# Patient Record
Sex: MALE | Race: WHITE | NOT HISPANIC OR LATINO | URBAN - METROPOLITAN AREA
[De-identification: names, ages, dates, MRNs, and addresses within clinical notes are randomized per-mention and may not be internally consistent; named-entity substitution may affect disease eponyms.]

---

## 2019-05-20 ENCOUNTER — TELEPHONE (OUTPATIENT)
Dept: OTHER | Facility: CLINIC | Age: 24
End: 2019-05-20

## 2019-07-19 ENCOUNTER — TELEPHONE (OUTPATIENT)
Dept: OTHER | Facility: CLINIC | Age: 24
End: 2019-07-19

## 2021-03-15 ENCOUNTER — TELEPHONE (OUTPATIENT)
Dept: FAMILY MEDICINE | Facility: CLINIC | Age: 26
End: 2021-03-15

## 2021-03-15 ENCOUNTER — OFFICE VISIT (OUTPATIENT)
Dept: FAMILY MEDICINE | Facility: CLINIC | Age: 26
End: 2021-03-15
Payer: COMMERCIAL

## 2021-03-15 VITALS
DIASTOLIC BLOOD PRESSURE: 97 MMHG | SYSTOLIC BLOOD PRESSURE: 165 MMHG | BODY MASS INDEX: 37.18 KG/M2 | WEIGHT: 299 LBS | HEART RATE: 90 BPM | RESPIRATION RATE: 15 BRPM | TEMPERATURE: 98.6 F | OXYGEN SATURATION: 95 % | HEIGHT: 75 IN

## 2021-03-15 DIAGNOSIS — M75.52 SCAPULOTHORACIC BURSITIS OF LEFT SHOULDER: Primary | ICD-10-CM

## 2021-03-15 RX ORDER — DEXTROAMPHETAMINE SACCHARATE, AMPHETAMINE ASPARTATE MONOHYDRATE, DEXTROAMPHETAMINE SULFATE AND AMPHETAMINE SULFATE 5; 5; 5; 5 MG/1; MG/1; MG/1; MG/1
20 CAPSULE, EXTENDED RELEASE ORAL
COMMUNITY
End: 2023-12-14

## 2021-03-15 ASSESSMENT — MIFFLIN-ST. JEOR: SCORE: 2426.89

## 2021-03-15 NOTE — LETTER
Return to Work  March 15, 2021     Seen today: yes    Patient:  Ned Lugo  :   1995  MRN:     [id number]  Physician: AMY ABRAHAM    Ned Lugo was seen today in clinic due to LEFT mid-back pain.   Sent to physical therapy.   Anticipate improvement over the next few weeks.   Electronically signed by Amy Abraham MD

## 2021-03-15 NOTE — PATIENT INSTRUCTIONS
1. LEFT scapulothoracic dysfunction   - Referred to Physical therapy.   - Also, given Rx for Voltaren ointment  - Icy-Hot patches at bedtime can help.  - [place] with pillows behind LEFT shoulder  - Decrease use of Ibuprofen. No more than 2,400 mg/day. Aim for less    2. Elevated blood pressure and BMI  -Discussed weight. He's drinking lots of sugar sweetened beverages  -Offered blood tests for diabetes and for thyroid. [place] deferred.   -Encouraged to schedule visit for primary care. Mentioned '[place]'    - Return if thoracic back/shoulder not improving.     --Rahul Abraham MD

## 2021-03-15 NOTE — PROGRESS NOTES
OVERVIEW: Ned Lugo is a 25 year old male who presents to [facility] today for Back Pain (x 3 weeks, initially coughed after moving/sleeping wrong, has been seeing a chiropracter, behind shoulder blade on left side, radiating to left ribs, not getting better )      ASSESSMENT/PLAN:  1. LEFT scapulothoracic dysfunction   - Referred to Physical therapy.   - Also, given Rx for Voltaren ointment  - Icy-Hot patches at bedtime can help.  - [place] with pillows behind LEFT shoulder  - Decrease use of Ibuprofen. No more than 2,400 mg/day. Aim for less    2. Elevated blood pressure and BMI  -Discussed weight. He's drinking lots of sugar sweetened beverages  -Offered blood tests for diabetes and for thyroid. Ned deferred.   -Encouraged to schedule visit for primary care. Mentioned '[place]'    - Return if thoracic back/shoulder not improving.     --Rahul Abraham MD      SUBJECTIVE:     Upper back pain  -3.5 weeks ago woke up with upper back pain several days after moving and lifting heavy boxes  - No recent trauma or new strenuous activity  - Pain is worst behind the left shoulder blade  - Aggravated by rasing left arm or coughing  - Worsened 2 weeks ago after coughing  - Hx of lower back pain, though this is different as it is in the upper back  - Has been seeing chiropractor for 2 weeks, feels like this has helped somewhat  - Switched mattresses 3 days ago, pain has been worse since that time  - Taking 1000 mg Ibuprofen every couple hours, without significant relief  - No numbness. Does occasionally feel week in the left hand.     Health maintainance:  - Pt reports he has gained approximately 30 lbs in the last year  - Has not had recent diabetes screening  - BP was 165/97 today, pt does not have a bp cuff at home to measure this  - Would like to defer establishing care for health maintenance/preventative medicine today    Review Of Systems:  Has otherwise been in usual state of health, e.g.  "  Cardiovascular: negative, palpitations, chest pain and exertional chest pain or pressure  Respiratory: No shortness of breath, dyspnea on exertion, cough, or hemoptysis.  Gastrointestinal: No abdominal pain, constipation, diarrhea, or incontinence,  Genitourinary: No urinary incontinence.     Problem list per EMR:  There is no problem list on file for this patient.    Current Outpatient Medications   Medication Sig Dispense Refill     amphetamine-dextroamphetamine (ADDERALL XR) 20 MG 24 hr capsule Take 20 mg by mouth       diclofenac (VOLTAREN) 1 % topical gel Apply 2 g topically 4 times daily 50 g 0     Allergies   Allergen Reactions     Seasonal Allergies       Social:   Smoker, currently lives with partner near [place]      OBJECTIVE    Vitals: BP (!) 165/97   Pulse 90   Temp 98.6  F (37  C) (Skin)   Resp 15   Ht 1.905 m (6' 3\")   Wt 135.6 kg (299 lb)   SpO2 95%   BMI 37.37 kg/m    BMI= Body mass index is 37.37 kg/m .    General: Above BMI noted.  Otherwise appears in no distress  Respiratory:  Lungs clear to auscultation bilaterally, no crackles/rubs/wheezes. Avoiding deep inspiration secondary to pain  CV: Normal rate and rhythm, no murmurs/rubs/gallops.  GI:  Abdomen soft and non-distended. Normoactive BS. No tenderness, guarding or rebound  Skin: Warm, dry. No rashes or petechiae. No lesions over the upper back.   Musculoskeletal: No peripheral edema or calf tenderness  Back: No lesions or rashes over the back. No midline bony tenderness, considerable tenderness to palpation over the musculature surrounding the left shoulder blade, most prominent over the upper left  paraspinous musculature at the level of T6. ROM of the left shoulder limited secondary to pain. Right side is normal without any tenderness to palpation.   Neuro: Alert and oriented to person/place/time. Sensation is intact over the bilateral upper extremities. Strength in the left upper extremity appears somewhat decreased though " this appears to be volitional secondary to pain in the left shoulder.    Psychiatric: Normal affect.    SEE TOP OF NOTE FOR ASSESSMENT AND PLAN    --Rahul Abraham MD  [facility], Department of Family Medicine and Community Health

## 2021-03-15 NOTE — NURSING NOTE
"25 year old  Chief Complaint   Patient presents with     Back Pain     x 3 weeks, initially coughed after moving/sleeping wrong, has been seeing a chiropracter, behind shoulder blade on left side, radiating to left ribs, not getting better        Blood pressure (!) 148/104, pulse 90, temperature 98.6  F (37  C), temperature source Skin, resp. rate 15, height 1.905 m (6' 3\"), weight 135.6 kg (299 lb), SpO2 95 %. Body mass index is 37.37 kg/m .  There is no problem list on file for this patient.      Wt Readings from Last 2 Encounters:   03/15/21 135.6 kg (299 lb)     BP Readings from Last 3 Encounters:   03/15/21 (!) 148/104         Current Outpatient Medications   Medication     amphetamine-dextroamphetamine (ADDERALL XR) 20 MG 24 hr capsule     No current facility-administered medications for this visit.        Social History     Tobacco Use     Smoking status: Current Every Day Smoker     Types: Cigarettes     Smokeless tobacco: Never Used   Substance Use Topics     Alcohol use: None     Drug use: None       Health Maintenance Due   Topic Date Due     PREVENTIVE CARE VISIT  Never done     ADVANCE CARE PLANNING  Never done     HPV IMMUNIZATION (1 - Male 2-dose series) Never done     HIV SCREENING  Never done     HEPATITIS C SCREENING  Never done     DTAP/TDAP/TD IMMUNIZATION (1 - Tdap) Never done     INFLUENZA VACCINE (1) 09/01/2020       No results found for: PAP      March 15, 2021 2:48 PM    "

## 2021-03-16 ENCOUNTER — THERAPY VISIT (OUTPATIENT)
Dept: PHYSICAL THERAPY | Facility: CLINIC | Age: 26
End: 2021-03-16
Payer: COMMERCIAL

## 2021-03-16 DIAGNOSIS — M75.52 SCAPULOTHORACIC BURSITIS OF LEFT SHOULDER: ICD-10-CM

## 2021-03-16 DIAGNOSIS — M25.512 LEFT SHOULDER PAIN: Primary | ICD-10-CM

## 2021-03-16 PROCEDURE — 97161 PT EVAL LOW COMPLEX 20 MIN: CPT | Mod: GP | Performed by: PHYSICAL THERAPIST

## 2021-03-16 PROCEDURE — 97110 THERAPEUTIC EXERCISES: CPT | Mod: GP | Performed by: PHYSICAL THERAPIST

## 2021-03-16 NOTE — PROGRESS NOTES
Physical Therapy Initial Examination/Evaluation  March 16, 2021    Ned Lugo is a 25 year old male referred to physical therapy by Rahul Abraham MD for treatment of left shoulder/thoracic pain with Precautions/Restrictions/MD instructions eval and treat    Therapist Impression:   Patient presents with increased pain with overhead motion secondary to decreased upright posture including forward head, protracted shoulders with internal rotation at rest. Patient given HEP focusing on posture, pec stretch and sidelying ER to increase posterior strength.     Subjective:  DOI/onset: 2/20/21 DOS: na  Acute Injury or Gradual Onset?: Acute injury onset  Mechanism of Injury: Lifting, moving  Related PMH: sciatica pain, minor back pain Previous Treatment: chiropractor, heat, ice, ibpruprofin Effect of prior treatment: good, then had to sleep with mattress on floor  Imaging: None  Chief Complaint/Functional Limitations:   Reaching overhead pain and see below in therapy evaluation codes   Pain: rest 0 /10, activity 8/10 Location: posterier shoulder, along rib  Frequency: Intermittent Described as: aching and sharp Alleviated by: Rest, Ice and pain meds Progression of Symptoms: Unchanged Time of day when pain is worse: Activity related, sleeping  Sleeping: Interrupted due to current issue   Occupation: line cook  Job duties: repetitive tasks, lifting/carrying, cutting at counter  Current HEP/exercise regimen: walks  Patient's goals are overhead reaching without pain, see chief complaints     Other pertinent PMH/Red Flags: Asthma, Depression, Mental Illness, Overweight, Sleep Disorder/Apnea, Smoking   Barriers at home/work: None as reported by patient  Pertinent Surgical History: na  Medications: omeprozal   General health as reported by patient: fair  Return to MD:  prn    SHOULDER EXAMINATION  Diagnosis: L shoulder pain    CERVICAL SCREEN  AROM: WNL/symmetrical pain-free    STATIC POSTURE  Forward head: moderate   Rounded  shoulders:moderate  Shoulder internally rotated: moderate   Visual inspection: Protracted scapula     DYNAMIC SCAPULAR TESTS  Dynamic Scapular Assessment: Delayed upward rotation on L and Protracted scapula     SHOULDER RANGE OF MOTION  AROM Flexion Abduction ER  Ext/IR   Left WNL - Painful arc  WNL - Painful arc , slight anterior drift WNL T6   Right WNL WNL WNL T6         SHOULDER STRENGTH  HHD Flexion Abduction ER Belly Press   Left 5/5 3+/5 - Painful 5-/5 5-/5   Right 5/5 5-/5 5-/5 5-/5       PALPATION  Left: Mild tenderness to palpation at medial boarder of L scapula  Right: No tenderness to palpation    Assessment/Plan:  Patient is a 25 year old male with thoracic and left side shoulder complaints.    Patient has the following significant findings with corresponding treatment plan.                Diagnosis 1:    Pain -  hot/cold therapy and manual therapy  Diagnosis 2:    Decreased Strength - therapeutic exercise and home program  Diagnosis 3:    Decreased function - therapeutic activities and home program     Therapy Evaluation Codes:   1) History comprised of:   Personal factors that impact the plan of care:      None.    Comorbidity factors that impact the plan of care are:      Asthma, Depression, Mental illness, Overweight, Sleep disorder/apnea and Smoking.     Medications impacting care: Acid reducer.  2) Examination of Body Systems comprised of:   Body structures and functions that impact the plan of care:      Shoulder and Thoracic Spine.   Activity limitations that impact the plan of care are:      Cooking, Dressing and Reaching overhead.  3) Clinical presentation characteristics are:   Stable/Uncomplicated.  4) Decision-Making    Low complexity using standardized patient assessment instrument and/or measureable assessment of functional outcome.  Cumulative Therapy Evaluation is: Low complexity.    Previous and current functional limitations:  (See Goal Flow Sheet for this information)     Short term and Long term goals: (See Goal Flow Sheet for this information)     Communication ability:  Patient appears to be able to clearly communicate and understand verbal and written communication and follow directions correctly.  Treatment Explanation - The following has been discussed with the patient:   RX ordered/plan of care  Anticipated outcomes  Possible risks and side effects  This patient would benefit from PT intervention to resume normal activities.   Rehab potential is good.    Frequency:  2 X a month, once daily  Duration:  for 3 months  Discharge Plan:  Achieve all LTG.  Independent in home treatment program.  Reach maximal therapeutic benefit.    Please refer to the daily flowsheet for treatment today, total treatment time and time spent performing 1:1 timed codes.

## 2022-01-26 ENCOUNTER — OFFICE VISIT (OUTPATIENT)
Dept: FAMILY MEDICINE | Facility: CLINIC | Age: 27
End: 2022-01-26
Payer: COMMERCIAL

## 2022-01-26 VITALS
SYSTOLIC BLOOD PRESSURE: 131 MMHG | OXYGEN SATURATION: 96 % | RESPIRATION RATE: 16 BRPM | TEMPERATURE: 98.1 F | DIASTOLIC BLOOD PRESSURE: 89 MMHG | HEART RATE: 100 BPM | HEIGHT: 75 IN | WEIGHT: 306.4 LBS | BODY MASS INDEX: 38.1 KG/M2

## 2022-01-26 DIAGNOSIS — Z23 HIGH PRIORITY FOR 2019-NCOV VACCINE: ICD-10-CM

## 2022-01-26 DIAGNOSIS — Z11.3 SCREENING FOR GONORRHEA: ICD-10-CM

## 2022-01-26 DIAGNOSIS — Z11.4 SCREENING FOR HIV (HUMAN IMMUNODEFICIENCY VIRUS): ICD-10-CM

## 2022-01-26 DIAGNOSIS — G47.9 FATIGUE DUE TO SLEEP PATTERN DISTURBANCE: ICD-10-CM

## 2022-01-26 DIAGNOSIS — Z11.8 SPECIAL SCREENING EXAMINATION FOR CHLAMYDIAL DISEASE: ICD-10-CM

## 2022-01-26 DIAGNOSIS — Z23 ENCOUNTER FOR IMMUNIZATION: ICD-10-CM

## 2022-01-26 DIAGNOSIS — Z76.89 ENCOUNTER TO ESTABLISH CARE WITH NEW DOCTOR: Primary | ICD-10-CM

## 2022-01-26 DIAGNOSIS — Z11.59 ENCOUNTER FOR HEPATITIS C SCREENING TEST FOR LOW RISK PATIENT: ICD-10-CM

## 2022-01-26 DIAGNOSIS — F90.9 ATTENTION DEFICIT HYPERACTIVITY DISORDER (ADHD), UNSPECIFIED ADHD TYPE: ICD-10-CM

## 2022-01-26 DIAGNOSIS — Z13.9 SCREENING FOR CONDITION: ICD-10-CM

## 2022-01-26 DIAGNOSIS — R53.83 FATIGUE DUE TO SLEEP PATTERN DISTURBANCE: ICD-10-CM

## 2022-01-26 LAB
AMPHETAMINES UR QL: NOT DETECTED
BARBITURATES UR QL SCN: NOT DETECTED
BENZODIAZ UR QL SCN: NOT DETECTED
BUPRENORPHINE UR QL: NOT DETECTED
CANNABINOIDS UR QL: DETECTED
COCAINE UR QL SCN: NOT DETECTED
D-METHAMPHET UR QL: NOT DETECTED
HCV AB SERPL QL IA: NONREACTIVE
HIV 1+2 AB+HIV1 P24 AG SERPL QL IA: NONREACTIVE
METHADONE UR QL SCN: NOT DETECTED
OPIATES UR QL SCN: NOT DETECTED
OXYCODONE UR QL SCN: NOT DETECTED
PCP UR QL SCN: NOT DETECTED
PROPOXYPH UR QL: NOT DETECTED
T PALLIDUM AB SER QL: NONREACTIVE
TRICYCLICS UR QL SCN: NOT DETECTED

## 2022-01-26 PROCEDURE — 91306 COVID-19,PF,MODERNA (18+ YRS BOOSTER .25ML): CPT | Performed by: STUDENT IN AN ORGANIZED HEALTH CARE EDUCATION/TRAINING PROGRAM

## 2022-01-26 PROCEDURE — 86803 HEPATITIS C AB TEST: CPT | Performed by: STUDENT IN AN ORGANIZED HEALTH CARE EDUCATION/TRAINING PROGRAM

## 2022-01-26 PROCEDURE — 87491 CHLMYD TRACH DNA AMP PROBE: CPT | Performed by: STUDENT IN AN ORGANIZED HEALTH CARE EDUCATION/TRAINING PROGRAM

## 2022-01-26 PROCEDURE — 87389 HIV-1 AG W/HIV-1&-2 AB AG IA: CPT | Performed by: STUDENT IN AN ORGANIZED HEALTH CARE EDUCATION/TRAINING PROGRAM

## 2022-01-26 PROCEDURE — 86780 TREPONEMA PALLIDUM: CPT | Performed by: STUDENT IN AN ORGANIZED HEALTH CARE EDUCATION/TRAINING PROGRAM

## 2022-01-26 PROCEDURE — 99395 PREV VISIT EST AGE 18-39: CPT | Mod: 25 | Performed by: STUDENT IN AN ORGANIZED HEALTH CARE EDUCATION/TRAINING PROGRAM

## 2022-01-26 PROCEDURE — 0064A COVID-19,PF,MODERNA (18+ YRS BOOSTER .25ML): CPT | Performed by: STUDENT IN AN ORGANIZED HEALTH CARE EDUCATION/TRAINING PROGRAM

## 2022-01-26 PROCEDURE — 90471 IMMUNIZATION ADMIN: CPT | Performed by: STUDENT IN AN ORGANIZED HEALTH CARE EDUCATION/TRAINING PROGRAM

## 2022-01-26 PROCEDURE — 80306 DRUG TEST PRSMV INSTRMNT: CPT | Performed by: STUDENT IN AN ORGANIZED HEALTH CARE EDUCATION/TRAINING PROGRAM

## 2022-01-26 PROCEDURE — 99214 OFFICE O/P EST MOD 30 MIN: CPT | Mod: 25 | Performed by: STUDENT IN AN ORGANIZED HEALTH CARE EDUCATION/TRAINING PROGRAM

## 2022-01-26 PROCEDURE — 90651 9VHPV VACCINE 2/3 DOSE IM: CPT | Performed by: STUDENT IN AN ORGANIZED HEALTH CARE EDUCATION/TRAINING PROGRAM

## 2022-01-26 PROCEDURE — 87591 N.GONORRHOEAE DNA AMP PROB: CPT | Performed by: STUDENT IN AN ORGANIZED HEALTH CARE EDUCATION/TRAINING PROGRAM

## 2022-01-26 PROCEDURE — 36415 COLL VENOUS BLD VENIPUNCTURE: CPT | Performed by: STUDENT IN AN ORGANIZED HEALTH CARE EDUCATION/TRAINING PROGRAM

## 2022-01-26 RX ORDER — DEXTROAMPHETAMINE SACCHARATE, AMPHETAMINE ASPARTATE, DEXTROAMPHETAMINE SULFATE AND AMPHETAMINE SULFATE 2.5; 2.5; 2.5; 2.5 MG/1; MG/1; MG/1; MG/1
10 TABLET ORAL DAILY
Qty: 30 TABLET | Refills: 0 | Status: SHIPPED | OUTPATIENT
Start: 2022-01-26 | End: 2023-12-14

## 2022-01-26 RX ORDER — OMEPRAZOLE 40 MG/1
CAPSULE, DELAYED RELEASE ORAL
COMMUNITY
Start: 2020-06-22

## 2022-01-26 RX ORDER — CX-024414 0.2 MG/ML
INJECTION, SUSPENSION INTRAMUSCULAR
COMMUNITY
Start: 2021-04-15 | End: 2023-12-14

## 2022-01-26 ASSESSMENT — MIFFLIN-ST. JEOR: SCORE: 2452.32

## 2022-01-26 NOTE — PATIENT INSTRUCTIONS
Patient Education   Here is the plan from today's visit    Encounter to establish care with new doctor  It was great to meet you today! Let's plan for you to come back for your physical exam in about 3-4 weeks and we can do a follow up for you ADHD meds at that time as well. Someone from clinic will call you with the results of your labs from today. Please feel free to reach out any time if you have any questions or concerns.     1. Screening for HIV (human immunodeficiency virus)  Screening labs today. We will call you with the results  - HIV Antigen Antibody Combo    2. Screening for gonorrhea  Screening labs today. We will call you with the results  - NEISSERIA GONORRHOEA PCR; Future  - Chlamydia trachomatis/Neisseria gonorrhoeae by PCR - Clinic Collect    3. Special screening examination for chlamydial disease  Screening labs today. We will call you with the results  - CHLAMYDIA TRACHOMATIS PCR; Future  - Chlamydia trachomatis/Neisseria gonorrhoeae by PCR - Clinic Collect    4. Screening for condition  Screening labs today. We will call you with the results  - Treponema Abs w Reflex to RPR and Titer    5. Encounter for hepatitis C screening test for low risk patient  Screening labs today. We will call you with the results  - Hepatitis C antibody    6. Fatigue due to sleep pattern disturbance  You should get a call for an appointment in the next week but if you don't hear from the clinic call the number below.   - SLEEP EVALUATION & MANAGEMENT REFERRAL - ADULT -; Future    7. Attention deficit hyperactivity disorder (ADHD), unspecified ADHD type  Right now we will do a one month fill and have you come back to see how your symptoms are going. We can do this during your physical exam and if every thing looks good at that time we can start doing 3 month refills. Adderall is a controlled substance so refills are fairly strictly regulated. If you have any side effects please come in sooner.   -  amphetamine-dextroamphetamine (ADDERALL) 10 MG tablet; Take 1 tablet (10 mg) by mouth daily  Dispense: 30 tablet; Refill: 0  - Urine Drugs of Abuse Screen Panel 13; Future      Please call or return to clinic if your symptoms don't go away.    Follow up plan  No follow-ups on file.    Thank you for coming to [facility] today.  Lab Testing:  **If you had lab testing today and your results are reassuring or normal they will be mailed to you or sent through [facility] within 7 days.   **If the lab tests need quick action we will call you with the results.  **If you are having labs done on a different day, please call [phone] to schedule at [facility] or [phone] for other [facility] Outpatient Lab locations. Labs do not offer walk-in appointments.  The phone number we will call with results is # [phone] (home) . If this is not the best number please call our clinic and change the number.  Medication Refills:  If you need any refills please call your pharmacy and they will contact us.   If you need to  your refill at a new pharmacy, please contact the new pharmacy directly. The new pharmacy will help you get your medications transferred faster.   Scheduling:  If you have any concerns about today's visit or wish to schedule another appointment please call our office during normal business hours [phone] (8-5:00 M-F)  If a referral was made to an [facility] specialty provider and you do not get a call from central scheduling, please refer to directions on your visit summary or call our office during normal business hours for assistance.   If a Mammogram was ordered for you at the Breast Center call [phone] to schedule or change your appointment.  If you had an XRay/CT/Ultrasound/MRI ordered the number is [phone] to schedule or change your radiology appointment.   [facility] has limited ultrasound appointments available on Wednesdays, if you would like your ultrasound  at [facility], please call [phone] to schedule.   Medical Concerns:  If you have urgent medical concerns please call [phone] at any time of the day.    Mary Gillespie MD

## 2022-01-26 NOTE — PROGRESS NOTES
Preceptor Attestation:    I discussed the patient with the resident and evaluated the patient in person. I have verified the content of the note, which accurately reflects my assessment of the patient and the plan of care.   Supervising Physician:  Luciano Simmons MD.

## 2022-01-26 NOTE — LETTER
Date:February 14, 2022      Provider requested that no letter be sent. Do not send.       [facility]

## 2022-01-26 NOTE — LETTER
February 11, 2022      Ned ACE Lugo  [address]  [place] [address]        Dear Ned,    Thank you for getting your care at [facility]. Please see below for your test results.    Resulted Orders   Chlamydia trachomatis/Neisseria gonorrhoeae by PCR - Clinic Collect   Result Value Ref Range    Chlamydia Trachomatis Negative Negative      Comment:      Negative for C. trachomatis rRNA by transcription mediated amplification.   A negative result by transcription mediated amplification does not preclude the presence of infection because results are dependent on proper and adequate collection, absence of inhibitors and sufficient rRNA to be detected.    Neisseria gonorrhoeae Negative Negative      Comment:      Negative for N. gonorrhoeae rRNA by transcription mediated amplification. A negative result by transcription mediated amplification does not preclude the presence of C. trachomatis infection because results are dependent on proper and adequate collection, absence of inhibitors and sufficient rRNA to be detected.   Treponema Abs w Reflex to RPR and Titer   Result Value Ref Range    Treponema Antibody Total Nonreactive Nonreactive   HIV Antigen Antibody Combo   Result Value Ref Range    HIV Antigen Antibody Combo Nonreactive Nonreactive      Comment:      HIV-1 p24 Ag & HIV-1/HIV-2 Ab Not Detected   Hepatitis C antibody   Result Value Ref Range    Hepatitis C Antibody Nonreactive Nonreactive    Narrative    Assay performance characteristics have not been established for newborns, infants, and children.   NEISSERIA GONORRHOEA PCR   Result Value Ref Range    Neisseria gonorrhoeae Negative Negative      Comment:      Negative for N. gonorrhoeae rRNA by transcription mediated amplification. A negative result by transcription mediated amplification does not preclude the presence of C. trachomatis infection because results are dependent on proper and adequate collection, absence of inhibitors and  sufficient rRNA to be detected.   CHLAMYDIA TRACHOMATIS PCR   Result Value Ref Range    Chlamydia trachomatis Negative Negative      Comment:      A negative result by transcription mediated amplification does not preclude the presence of C. trachomatis infection because results are dependent on proper and adequate collection, absence of inhibitors and sufficient rRNA to be detected.   Urine Drugs of Abuse Screen Panel 13   Result Value Ref Range    Cannabinoids (72-lep-4-carboxy-9-THC) Detected (A) Not Detected, Indeterminate      Comment:      Cutoff for a positive cannabinoid is greater than 50 ng/ml.  This is an unconfirmed screening result to be used for medical purposes only.     Phencyclidine Not Detected Not Detected, Indeterminate      Comment:      Cutoff for a negative PCP is 25 ng/mL or less.    Cocaine (Benzoylecgonine) Not Detected Not Detected, Indeterminate      Comment:      Cutoff for a negative cocaine is 150 ng/ml or less.    Methamphetamine (d-Methamphetamine) Not Detected Not Detected, Indeterminate      Comment:      Cutoff for a negative methamphetamine is 500 ng/ml or less.    Opiates (Morphine) Not Detected Not Detected, Indeterminate      Comment:      Cutoff for a negative opiate is 100 ng/ml or less.    Amphetamine (d-Amphetamine) Not Detected Not Detected, Indeterminate      Comment:      Cutoff for a negative amphetamine is 500 ng/mL or less.    Benzodiazepines (Nordiazepam) Not Detected Not Detected, Indeterminate      Comment:      Cutoff for a negative benzodiazepine is 150 ng/ml or less.    Tricyclic Antidepressants (Desipramine) Not Detected Not Detected, Indeterminate      Comment:      Cutoff for a negative tricyclic antidepressant is 300 ng/ml or less.    Methadone Not Detected Not Detected, Indeterminate      Comment:      Cutoff for a negative methadone is 200 ng/ml or less.    Barbiturates (Butalbital) Not Detected Not Detected, Indeterminate      Comment:      Cutoff for a  negative barbituate is 200 ng/ml or less.    Oxycodone Not Detected Not Detected, Indeterminate      Comment:      Cutoff for a negative oxycodone is 100 ng/mL or less.    Propoxyphene (Norpropoxyphene) Not Detected Not Detected, Indeterminate      Comment:      Cutoff for a negative propoxyphene is 300 ng/ml or less.    Buprenorphine Not Detected Not Detected, Indeterminate      Comment:      Cutoff for a negative buprenorphine is 10 ng/ml or less.       If you have any concerns about these results please call and leave a message for me or send a [facility] message to the clinic.    Sincerely,    Mary Gillespie MD

## 2022-01-26 NOTE — PROGRESS NOTES
Assessment & Plan     Encounter to establish care with new doctor  26 year old individual who uses they/them pronouns. Here today for ADHD meds, sleep medicine referral, STI testing, and physical exam. Due to time constraints recommend return for physical exam in about 3-4 weeks with ADHD follow up at that time. Desires STI testing today. Last check 2 years ago, right before last relationship started and now seeing new people. Pansexual, oral and genitale sex for screening. No history of STIs.     Screening for HIV (human immunodeficiency virus)  Screening labs today. We will call you with the results  - HIV Antigen Antibody Combo    Screening for gonorrhea  Screening labs today. We will call you with the results  - NEISSERIA GONORRHOEA PCR  - Chlamydia trachomatis/Neisseria gonorrhoeae by PCR - Clinic Collect    Special screening examination for chlamydial disease  Screening labs today. We will call you with the results  - Chlamydia trachomatis/Neisseria gonorrhoeae by PCR - Clinic Collect  - CHLAMYDIA TRACHOMATIS PCR    Screening for condition  Screening labs today. We will call you with the results  - Treponema Abs w Reflex to RPR and Titer    Encounter for hepatitis C screening test for low risk patient  Screening labs today. We will call you with the results  - Hepatitis C antibody    Fatigue due to sleep pattern disturbance  Significant history for snoring and has been told by others that they will stop breathing during sleep. Significant daytime fatigue. Overweight individual with significant soft tissue around neck all of which makes for high likelihood for sleep apnea. Referral for sleep study/evaluation with sleep medicine placed today.   - SLEEP EVALUATION & MANAGEMENT REFERRAL - ADULT -    Attention deficit hyperactivity disorder (ADHD), unspecified ADHD type  Would like to restart Adderall for ADHD. Was diagnosed in childhood and took 30 mg in high school which worked well. As an adult has continued  "to have issues with focus requiring treatment though finds lower doses are adequate for level of attention work requires. Has had periods without treatment when working jobs that required less mental focus. Last taking 10 mg daily when seen at [facility]. Does not tolerate extended release to to feeling \"hung over.\" Typically takes 10 mg IR in the mornings and will occasionally take additional 10 mg in the afternoon if needed for evening work shift. Other than lack of appetite no noted side effects. Single month prescription sent today with plan for urine drug test and follow up in 1 month to assess symptoms.   - amphetamine-dextroamphetamine (ADDERALL) 10 MG tablet  Dispense: 30 tablet; Refill: 0  - Urine Drugs of Abuse Screen Panel 13    Encounter for immunization  - HPV9 (Gardasil 9 )    High priority for 2019-nCoV vaccine  - COVID-19,PF,MODERNA (18+ Yrs BOOSTER .25mL)      Tobacco Cessation:   reports that he has been smoking cigarettes. He has been smoking about 0.50 packs per day. He has never used smokeless tobacco.  Tobacco Cessation Action Plan: Information offered: Patient not interested at this time          No follow-ups on file.    Mary Gillespie MD  [facility] Marino is a 26 year old who presents for the following health issues:    HPI     They/them pronouns  Recently got new insurance  Wants physical exam, sleep study, ADHD meds refilled.     Works as a  at [employer], living in [place]. Has taken Adderall in the past for ADHD, feels the immediate release works much better for them than the extended release which causes hangover-like symptoms. Immediate release allows for more normal energy cycling. Was taking 30 mg in high school but was last taking 10 mg daily when seen at [facility]. Other than lack of appetite no other negative side effects. Smokes marijuana about once a day/once every other day. Drinks " "rarely (1-2 beers a week).     Stop breathing, then gasps for air per friends. Lots of daytime fatigue, waking up feeling tired.     Desires STI check today. Last check 2 years ago, right before last relationship started and now seeing new people. Pansexual, oral and genitale sex for screening. No history of STIs.     Takes omeprazole for reflux and this works well for them. Previously used Voltaren gel for back pain issues which have since resolved.       Review of Systems   ROS otherwise negative if not stated in HPI        Objective    /89   Pulse 100   Temp 98.1  F (36.7  C) (Oral)   Resp 16   Ht 1.9 m (6' 2.8\")   Wt 139 kg (306 lb 6.4 oz)   SpO2 96%   BMI 38.50 kg/m    Body mass index is 38.5 kg/m .  Physical Exam   GENERAL: alert, no distress, over weight and appears older than stated age  EYES: Eyes grossly normal to inspection, PERRL and conjunctivae and sclerae normal  NECK: no adenopathy, no asymmetry, masses, or scars and thyroid normal to palpation  RESP: lungs clear to auscultation - no rales, rhonchi or wheezes  CV: regular rate and rhythm, normal S1 S2, no S3 or S4, no murmur, click or rub, no peripheral edema and peripheral pulses strong  MS: no gross musculoskeletal defects noted, no edema  SKIN: no suspicious lesions or rashes  NEURO: Normal strength and tone, mentation intact and speech normal  PSYCH: mentation appears normal, affect normal/bright    Results for orders placed or performed in visit on 01/26/22   Treponema Abs w Reflex to RPR and Titer     Status: Normal   Result Value Ref Range    Treponema Antibody Total Nonreactive Nonreactive   HIV Antigen Antibody Combo     Status: Normal   Result Value Ref Range    HIV Antigen Antibody Combo Nonreactive Nonreactive   Hepatitis C antibody     Status: Normal   Result Value Ref Range    Hepatitis C Antibody Nonreactive Nonreactive    Narrative    Assay performance characteristics have not been established for newborns, infants, and " children.   Urine Drugs of Abuse Screen Panel 13     Status: Abnormal   Result Value Ref Range    Cannabinoids (70-htr-1-carboxy-9-THC) Detected (A) Not Detected, Indeterminate    Phencyclidine Not Detected Not Detected, Indeterminate    Cocaine (Benzoylecgonine) Not Detected Not Detected, Indeterminate    Methamphetamine (d-Methamphetamine) Not Detected Not Detected, Indeterminate    Opiates (Morphine) Not Detected Not Detected, Indeterminate    Amphetamine (d-Amphetamine) Not Detected Not Detected, Indeterminate    Benzodiazepines (Nordiazepam) Not Detected Not Detected, Indeterminate    Tricyclic Antidepressants (Desipramine) Not Detected Not Detected, Indeterminate    Methadone Not Detected Not Detected, Indeterminate    Barbiturates (Butalbital) Not Detected Not Detected, Indeterminate    Oxycodone Not Detected Not Detected, Indeterminate    Propoxyphene (Norpropoxyphene) Not Detected Not Detected, Indeterminate    Buprenorphine Not Detected Not Detected, Indeterminate   Chlamydia trachomatis/Neisseria gonorrhoeae by PCR - Clinic Collect     Status: Normal    Specimen: Throat; Swab   Result Value Ref Range    Chlamydia Trachomatis Negative Negative    Neisseria gonorrhoeae Negative Negative   NEISSERIA GONORRHOEA PCR     Status: Normal    Specimen: Urine, Voided   Result Value Ref Range    Neisseria gonorrhoeae Negative Negative   CHLAMYDIA TRACHOMATIS PCR     Status: Normal    Specimen: Urine, Voided   Result Value Ref Range    Chlamydia trachomatis Negative Negative

## 2022-01-27 LAB
C TRACH DNA SPEC QL NAA+PROBE: NEGATIVE
C TRACH DNA SPEC QL PROBE+SIG AMP: NEGATIVE
N GONORRHOEA DNA SPEC QL NAA+PROBE: NEGATIVE
N GONORRHOEA DNA SPEC QL NAA+PROBE: NEGATIVE

## 2023-06-15 ENCOUNTER — VIRTUAL VISIT (OUTPATIENT)
Dept: FAMILY MEDICINE | Facility: CLINIC | Age: 28
End: 2023-06-15
Payer: COMMERCIAL

## 2023-06-15 DIAGNOSIS — H10.32 ACUTE BACTERIAL CONJUNCTIVITIS OF LEFT EYE: Primary | ICD-10-CM

## 2023-06-15 PROCEDURE — 99213 OFFICE O/P EST LOW 20 MIN: CPT | Mod: VID | Performed by: PHYSICIAN ASSISTANT

## 2023-06-15 RX ORDER — DEXTROAMPHETAMINE SACCHARATE, AMPHETAMINE ASPARTATE, DEXTROAMPHETAMINE SULFATE AND AMPHETAMINE SULFATE 2.5; 2.5; 2.5; 2.5 MG/1; MG/1; MG/1; MG/1
10 TABLET ORAL DAILY
Qty: 30 TABLET | Refills: 0 | Status: CANCELLED | OUTPATIENT
Start: 2023-06-15

## 2023-06-15 RX ORDER — OFLOXACIN 3 MG/ML
1-2 SOLUTION/ DROPS OPHTHALMIC
Qty: 5 ML | Refills: 0 | Status: SHIPPED | OUTPATIENT
Start: 2023-06-15 | End: 2023-12-14

## 2023-06-15 NOTE — LETTER
Agatha 15, 2023      Ned Lugo  [address]  [place] [address]        To Whom It May Concern:    Ned Lugo  was seen on 06-  Please excuse him for work missed today due to illness symptoms.        Sincerely,        Cathryn Beckman PA-C

## 2023-06-15 NOTE — PROGRESS NOTES
Ned is a 27 year old who is being evaluated via a billable video visit.      How would you like to obtain your AVS? MyChart  If the video visit is dropped, the invitation should be resent by: Text to cell phone: [phone]  Will anyone else be joining your video visit? No      Assessment & Plan     Acute bacterial conjunctivitis of left eye  Drops per below. Hygiene and handwashing precautions discussed.  S/sx for in person visit discussed- headache, vision change, fever, light sensitivity, rash.   - ofloxacin (OCUFLOX) 0.3 % ophthalmic solution; Place 1-2 drops Into the left eye every 2 hours (while awake)       Nicotine/Tobacco Cessation:  He reports that he has been smoking cigarettes. He has been smoking an average of .5 packs per day. He has never used smokeless tobacco.  Nicotine/Tobacco Cessation Plan:   Information offered: Patient not interested at this time      Follow Up: The patient was instructed to contact clinic for worsening symptoms, non-improvement in time frame as expected/discussed, and for questions regarding medications or treatment plan. For virtual visits, the patient was advised to be seen for in person evaluation if symptoms or condition are worsening or non-improvement as expected.       Cathryn Beckman PA-C  [facility] Marino is a 27 year old, presenting for the following health issues:  Eye Problem        6/15/2023    12:23 PM   Additional Questions   Roomed by Chandana GARCIA   Accompanied by Self         6/15/2023    12:23 PM   Patient Reported Additional Medications   Patient reports taking the following new medications None     History of Present Illness       Reason for visit:  Possible [place]    He eats 2-3 servings of fruits and vegetables daily.He consumes 6 sweetened beverage(s) daily.He exercises with enough effort to increase his heart rate 30 to 60 minutes per day.  He exercises with enough effort to increase his heart rate 3 or less days  per week.   He is taking medications regularly.       Eye(s) Problem  Onset/Duration: Woke up this morning and Left Eye was goopy- with yellowish material and lid puffy, Left eye redish pink and glossy.   No contact lens use.   No fevers or URI sx  No hx of eye surgery.   No known exposures  Description:   Location: Left Eye  Pain: No  Redness: YES  Accompanying Signs & Symptoms:  Discharge/mattering: YES  Swelling: YES- Puffy  Visual changes: No   Fever: No  Nasal Congestion: Seasonal allergies  Bothered by bright lights: YES-  irritated feeling not significant.   History:  Trauma: No  Foreign body exposure: No  Wearing contacts: No  Precipitating or alleviating factors: None  Therapies tried and outcome: None        Review of Systems   Constitutional, HEENT, cardiovascular, pulmonary, gi and gu systems are negative, except as otherwise noted.      Objective           Vitals:  No vitals were obtained today due to virtual visit.    Physical Exam   GENERAL: Healthy, alert and no distress  EYES: RIGHT : Eye grossly normal to inspection. LEFT: mild conjunctival erythema/injection, mild puffiness of upper lid. Unable to determine if drainage. Video limits quality of exam. Normal EOMs.   HENT: Normal cephalic/atraumatic.  External ears, nose and mouth without ulcers or lesions.  No nasal drainage visible.  RESP: No audible wheeze, cough, or visible cyanosis.  No visible retractions or increased work of breathing.    SKIN: Visible skin clear. No significant rash, abnormal pigmentation or lesions.  PSYCH: Mentation appears normal, affect normal/bright, judgement and insight intact, normal speech and appearance well-groomed.                Video-Visit Details    Type of service:  Video Visit     Originating Location (pt. Location): Home  Distant Location (provider location):  Off-site  Platform used for Video Visit: Embedly

## 2023-06-15 NOTE — PATIENT INSTRUCTIONS
Thank you for choosing us for your care. I have placed an order for a prescription so that you can start treatment. View your full visit summary for details by clicking on the link below. Your pharmacist will able to address any questions you may have about the medication.     If you re not feeling better within 2-3 days, please schedule an appointment.  You can schedule an appointment right here in [facility], or call [phone]  If the visit is for the same symptoms as your eVisit, we ll refund the cost of your eVisit if seen within seven days.      Bacterial Conjunctivitis    You have an infection in the membranes covering the white part of the eye. This part of the eye is called the conjunctiva. The infection is called conjunctivitis. The most common symptoms of conjunctivitis include a thick, pus-like discharge from the eye, swollen eyelids, redness, eyelids sticking together upon awakening, and a gritty or scratchy feeling in the eye. Your infection was caused by bacteria. It may be treated with medicine. With treatment, the infection takes about 7 to 10 days to resolve.   Home care    Use prescribed antibiotic eye drops or ointment as directed to treat the infection.    Apply a warm compress (towel soaked in warm water) to the affected eye 3 to 4 times a day. Do this just before applying medicine to the eye.    Use a warm, wet cloth to wipe away crusting of the eyelids in the morning. This is caused by mucus drainage during the night. You may also use saline irrigating solution or artificial tears to rinse away mucus in the eye. Do not put a patch over the eye.    Wash your hands before and after touching the infected eye. This is to prevent spreading the infection to the other eye, and to other people. Don't share your towels or washcloths with others.    You may use acetaminophen or ibuprofen to control pain, unless another medicine was prescribed. Talk with your healthcare provider before using these  medicines if you have chronic liver or kidney disease. Also talk with your provider if you have ever had a stomach ulcer or digestive bleeding.    Don't wear contact lenses until your eyes have healed and all symptoms are gone.    Follow-up care  Follow up with your healthcare provider, or as advised.  When to seek medical advice  Call your healthcare provider right away if any of these occur:    Worsening vision    Increasing pain in the eye    Increasing swelling or redness of the eyelid    Redness spreading around the eye  [employer]ulse[employer] last reviewed this educational content on 4/1/2020 2000-2022 [employer]. All rights reserved. This information is not intended as a substitute for professional medical care. Always follow your healthcare professional's instructions.

## 2023-06-20 NOTE — TELEPHONE ENCOUNTER
PA Initiation    Medication: diclofenac (VOLTAREN) 1 % topical gel -   Insurance Company: [employer] - Phone [phone] Fax [phone]  Pharmacy Filling the Rx: [id number] - [place], [place] - [address]  Filling Pharmacy Phone: [phone]  Filling Pharmacy Fax: [phone]  Start Date: 3/16/2021        
Prior Authorization Approval    Medication: diclofenac (VOLTAREN) 1 % topical gel - APPROVED was approved on 3/16/2021  Effective: 2/14/2021 to 3/16/2022  Reference #: [id number]  Approved Dose/Quantity:   Insurance Company: [employer] - Phone [phone] Fax [phone]  Expected CoPay:    Pharmacy [facility]illing the Rx[id number] [place], [place] - [address]  Pharmacy Notified: Yes  Patient Notified: Comment:  **Instructed pharmacy to notify patient when script is ready to /ship.**      
Prior Authorization Retail Medication Request    Medication/Dose: diclofenac (VOLTAREN) 1 % topical gel  ICD code (if different than what is on RX):    Previously Tried and Failed:    Rationale:      Insurance Name:    Insurance ID:        Pharmacy Information (if different than what is on RX)  Name:    Phone:      
No

## 2023-07-30 ENCOUNTER — HEALTH MAINTENANCE LETTER (OUTPATIENT)
Age: 28
End: 2023-07-30

## 2023-12-14 ENCOUNTER — OFFICE VISIT (OUTPATIENT)
Dept: FAMILY MEDICINE | Facility: CLINIC | Age: 28
End: 2023-12-14
Payer: COMMERCIAL

## 2023-12-14 VITALS
HEIGHT: 75 IN | SYSTOLIC BLOOD PRESSURE: 138 MMHG | TEMPERATURE: 97.2 F | HEART RATE: 99 BPM | BODY MASS INDEX: 39.17 KG/M2 | RESPIRATION RATE: 20 BRPM | DIASTOLIC BLOOD PRESSURE: 89 MMHG | OXYGEN SATURATION: 96 % | WEIGHT: 315 LBS

## 2023-12-14 DIAGNOSIS — F90.0 ATTENTION DEFICIT HYPERACTIVITY DISORDER (ADHD), PREDOMINANTLY INATTENTIVE TYPE: ICD-10-CM

## 2023-12-14 DIAGNOSIS — Z11.3 ROUTINE SCREENING FOR STI (SEXUALLY TRANSMITTED INFECTION): ICD-10-CM

## 2023-12-14 DIAGNOSIS — Z00.00 ROUTINE GENERAL MEDICAL EXAMINATION AT A HEALTH CARE FACILITY: Primary | ICD-10-CM

## 2023-12-14 DIAGNOSIS — E66.813 CLASS 3 OBESITY: ICD-10-CM

## 2023-12-14 DIAGNOSIS — R06.83 SNORING: ICD-10-CM

## 2023-12-14 DIAGNOSIS — K21.9 GASTROESOPHAGEAL REFLUX DISEASE WITHOUT ESOPHAGITIS: ICD-10-CM

## 2023-12-14 PROBLEM — F41.9 ANXIETY: Status: ACTIVE | Noted: 2023-12-14

## 2023-12-14 PROBLEM — F98.8 ADD (ATTENTION DEFICIT DISORDER): Status: ACTIVE | Noted: 2017-11-10

## 2023-12-14 PROBLEM — G47.33 OBSTRUCTIVE SLEEP APNEA SYNDROME: Status: ACTIVE | Noted: 2023-12-14

## 2023-12-14 PROBLEM — J30.2 SEASONAL ALLERGIES: Status: ACTIVE | Noted: 2023-12-14

## 2023-12-14 LAB
C TRACH DNA SPEC QL PROBE+SIG AMP: NEGATIVE
N GONORRHOEA DNA SPEC QL NAA+PROBE: NEGATIVE

## 2023-12-14 PROCEDURE — 90651 9VHPV VACCINE 2/3 DOSE IM: CPT

## 2023-12-14 PROCEDURE — 90472 IMMUNIZATION ADMIN EACH ADD: CPT

## 2023-12-14 PROCEDURE — 99395 PREV VISIT EST AGE 18-39: CPT | Mod: 25

## 2023-12-14 PROCEDURE — 91320 SARSCV2 VAC 30MCG TRS-SUC IM: CPT

## 2023-12-14 PROCEDURE — 90471 IMMUNIZATION ADMIN: CPT

## 2023-12-14 PROCEDURE — 99214 OFFICE O/P EST MOD 30 MIN: CPT | Mod: 25

## 2023-12-14 PROCEDURE — 90686 IIV4 VACC NO PRSV 0.5 ML IM: CPT

## 2023-12-14 PROCEDURE — 87591 N.GONORRHOEAE DNA AMP PROB: CPT

## 2023-12-14 PROCEDURE — 90480 ADMN SARSCOV2 VAC 1/ONLY CMP: CPT

## 2023-12-14 PROCEDURE — 87491 CHLMYD TRACH DNA AMP PROBE: CPT

## 2023-12-14 RX ORDER — DEXTROAMPHETAMINE SACCHARATE, AMPHETAMINE ASPARTATE, DEXTROAMPHETAMINE SULFATE AND AMPHETAMINE SULFATE 2.5; 2.5; 2.5; 2.5 MG/1; MG/1; MG/1; MG/1
10 TABLET ORAL DAILY
Qty: 30 TABLET | Refills: 0 | Status: SHIPPED | OUTPATIENT
Start: 2023-12-14 | End: 2024-01-22

## 2023-12-14 RX ORDER — DEXTROAMPHETAMINE SACCHARATE, AMPHETAMINE ASPARTATE, DEXTROAMPHETAMINE SULFATE AND AMPHETAMINE SULFATE 2.5; 2.5; 2.5; 2.5 MG/1; MG/1; MG/1; MG/1
10 TABLET ORAL DAILY
Qty: 30 TABLET | Refills: 0 | Status: SHIPPED | OUTPATIENT
Start: 2024-02-14 | End: 2024-03-15

## 2023-12-14 RX ORDER — DEXTROAMPHETAMINE SACCHARATE, AMPHETAMINE ASPARTATE, DEXTROAMPHETAMINE SULFATE AND AMPHETAMINE SULFATE 5; 5; 5; 5 MG/1; MG/1; MG/1; MG/1
20 TABLET ORAL DAILY
Qty: 30 TABLET | Refills: 0 | Status: SHIPPED | OUTPATIENT
Start: 2024-02-14 | End: 2024-03-15

## 2023-12-14 RX ORDER — DEXTROAMPHETAMINE SACCHARATE, AMPHETAMINE ASPARTATE, DEXTROAMPHETAMINE SULFATE AND AMPHETAMINE SULFATE 2.5; 2.5; 2.5; 2.5 MG/1; MG/1; MG/1; MG/1
10 TABLET ORAL DAILY
Qty: 30 TABLET | Refills: 0 | Status: SHIPPED | OUTPATIENT
Start: 2024-01-14 | End: 2024-02-13

## 2023-12-14 RX ORDER — DEXTROAMPHETAMINE SACCHARATE, AMPHETAMINE ASPARTATE, DEXTROAMPHETAMINE SULFATE AND AMPHETAMINE SULFATE 5; 5; 5; 5 MG/1; MG/1; MG/1; MG/1
20 TABLET ORAL DAILY
Qty: 30 TABLET | Refills: 0 | Status: SHIPPED | OUTPATIENT
Start: 2023-12-14 | End: 2024-01-22

## 2023-12-14 RX ORDER — DEXTROAMPHETAMINE SACCHARATE, AMPHETAMINE ASPARTATE, DEXTROAMPHETAMINE SULFATE AND AMPHETAMINE SULFATE 5; 5; 5; 5 MG/1; MG/1; MG/1; MG/1
20 TABLET ORAL DAILY
Qty: 30 TABLET | Refills: 0 | Status: SHIPPED | OUTPATIENT
Start: 2024-01-14 | End: 2024-02-13

## 2023-12-14 ASSESSMENT — ENCOUNTER SYMPTOMS
COUGH: 0
WEAKNESS: 0
JOINT SWELLING: 0
DYSURIA: 0
FEVER: 0
NERVOUS/ANXIOUS: 1
ABDOMINAL PAIN: 0
MYALGIAS: 0
FREQUENCY: 0
HEARTBURN: 1
NAUSEA: 0
BREAST MASS: 0
DIARRHEA: 0
CONSTIPATION: 0
SHORTNESS OF BREATH: 0
EYE PAIN: 0
PALPITATIONS: 0
HEADACHES: 0
ARTHRALGIAS: 0
DIZZINESS: 0
CHILLS: 0
SORE THROAT: 0
HEMATOCHEZIA: 0
PARESTHESIAS: 0
HEMATURIA: 0

## 2023-12-14 ASSESSMENT — PAIN SCALES - GENERAL: PAINLEVEL: NO PAIN (0)

## 2023-12-14 NOTE — PROGRESS NOTES
"SUBJECTIVE:   Ned is a 28 year old, presenting for the following:  Establish Care, Physical, and Sleep Apnea        12/14/2023     7:35 AM   Additional Questions   Roomed by arabella         12/14/2023     7:35 AM   Patient Reported Additional Medications   Patient reports taking the following new medications none       Healthy Habits:     Getting at least 3 servings of Calcium per day:  NO    Bi-annual eye exam:  NO    Dental care twice a year:  NO    Sleep apnea or symptoms of sleep apnea:  Daytime drowsiness, Excessive snoring and Sleep apnea    Diet:  Regular (no restrictions)    Frequency of exercise:  None    Taking medications regularly:  Yes    Medication side effects:  Not applicable    Concern for sleep apnea. Excessive snoring. Significant fatigue during the day, does not feel well rested. Has not previously had a sleep study.     Has been off Adderall over a year. Having difficulty remembering things and keeping things straight. Having difficulty in work as a . Diagnosed in 4th grade.     Heartburn daily. Inconsistent diet, eats late at night on days they work. Taking 40mg omeprazole. Waking up 2-5 times a night due to acid reflux.     Have you ever done Advance Care Planning? (For example, a Health Directive, POLST, or a discussion with a medical provider or your loved ones about your wishes): No, advance care planning information given to patient to review.  Patient declined advance care planning discussion at this time.    Social History     Tobacco Use    Smoking status: Every Day     Packs/day: .5     Types: Cigarettes    Smokeless tobacco: Never   Substance Use Topics    Alcohol use: Yes     Comment: occ         12/14/2023     7:22 AM   Alcohol Use   Prescreen: >3 drinks/day or >7 drinks/week? No     Last PSA: No results found for: \"PSA\"    Reviewed orders with patient. Reviewed health maintenance and updated orders accordingly - Yes  - Colon cancer screening: No symptoms or family history. " "Screening to start at 45  - Vaccines reviewed and discussed at this encounter: Flu, COVID, HPV    Contraception: condoms. Patient is satisfied with current method of contraception.   Sexually active with multiple partners, does desire STI testing today. Reports consistent condom use.     Reviewed and updated as needed this visit by clinical staff   Tobacco  Allergies  Meds  Problems  Med Hx  Surg Hx  Fam Hx          Reviewed and updated as needed this visit by Provider   Tobacco  Allergies  Meds  Problems  Med Hx  Surg Hx  Fam Hx         Review of Systems   Constitutional:  Negative for chills and fever.   HENT:  Negative for congestion, ear pain, hearing loss and sore throat.    Eyes:  Negative for pain and visual disturbance.   Respiratory:  Negative for cough and shortness of breath.    Cardiovascular:  Negative for chest pain and palpitations.   Gastrointestinal:  Negative for abdominal pain, constipation, diarrhea and nausea.   Genitourinary:  Negative for dysuria, frequency, genital sores, hematuria and urgency.   Musculoskeletal:  Negative for arthralgias, joint swelling and myalgias.   Skin:  Negative for rash.   Neurological:  Negative for dizziness, weakness and headaches.   Psychiatric/Behavioral:  The patient is nervous/anxious.      OBJECTIVE:   /89 (BP Location: Left arm, Patient Position: Sitting, Cuff Size: Adult Large)   Pulse 99   Temp 97.2  F (36.2  C) (Tympanic)   Resp 20   Ht 1.905 m (6' 3\")   Wt 148.1 kg (326 lb 9.6 oz)   SpO2 96%   BMI 40.82 kg/m      Physical Exam  GENERAL: healthy, alert and no distress  EYES: Eyes grossly normal to inspection, PERRL and conjunctivae and sclerae normal  HENT: ear canals and TM's normal, nose and mouth without ulcers or lesions  NECK: no adenopathy, no asymmetry, masses, or scars and thyroid normal to palpation  RESP: lungs clear to auscultation - no rales, rhonchi or wheezes  CV: regular rate and rhythm, normal S1 S2, no S3 or S4, " no murmur, click or rub, no peripheral edema and peripheral pulses strong  ABDOMEN: soft, nontender, no hepatosplenomegaly, no masses and bowel sounds normal  MS: no gross musculoskeletal defects noted, no edema  SKIN: no suspicious lesions or rashes  NEURO: Normal strength and tone, mentation intact and speech normal  PSYCH: mentation appears normal, affect normal/bright    ASSESSMENT/PLAN:   Ned was seen today for establish care, physical and sleep apnea.    Diagnoses and all orders for this visit:    Routine general medical examination at a health care facility  -     REVIEW OF HEALTH MAINTENANCE PROTOCOL ORDERS  -     HPV9 (GARDASIL 9)  -     COVID-19 12+ (2023-24) (PFIZER)  -     PRIMARY CARE FOLLOW-UP SCHEDULING; Future  -     INFLUENZA VACCINE >6 MONTHS (AFLURIA/FLUZONE)    Attention deficit hyperactivity disorder (ADHD), predominantly inattentive type        - Restarting Adderall at previous dose. PDMP reviewed and appropriate.        - Virtual or in-person visits every 6 months.   -     amphetamine-dextroamphetamine (ADDERALL) 20 MG tablet; Take 1 tablet (20 mg) by mouth daily for 30 days  -     amphetamine-dextroamphetamine (ADDERALL) 20 MG tablet; Take 1 tablet (20 mg) by mouth daily for 30 days  -     amphetamine-dextroamphetamine (ADDERALL) 20 MG tablet; Take 1 tablet (20 mg) by mouth daily for 30 days  -     amphetamine-dextroamphetamine (ADDERALL) 10 MG tablet; Take 1 tablet (10 mg) by mouth daily for 30 days  -     amphetamine-dextroamphetamine (ADDERALL) 10 MG tablet; Take 1 tablet (10 mg) by mouth daily for 30 days  -     amphetamine-dextroamphetamine (ADDERALL) 10 MG tablet; Take 1 tablet (10 mg) by mouth daily for 30 days    Routine screening for STI (sexually transmitted infection)  -    Recommended continued condom use for STI prevention and routine testing.  -  HIV Antigen Antibody Combo; Future  -     Treponema Abs w Reflex to RPR and Titer; Future  -     Hepatitis B surface antigen;  "Future  -     Hepatitis C antibody; Future  -     Chlamydia & Gonorrhea by PCR, [facility] - Clinic Collect  -     Chlamydia & Gonorrhea by PCR, [facility] - Clinic Collect    GERD without esophagitis       - Continued symptoms on 40 mg omeprazole daily.         - Recommended avoidance of triggers, not eating close to bedtime.        - Discussed EGD for workup, patient declines at this time.     Snoring  -     Adult Sleep Eval & Management Referral; Future    Class 3 obesity (H)         - Noted. Likely contributing to JUICE and GERD. Discussed diet and exercise guidelines    Patient has been advised of split billing requirements and indicates understanding: Yes    COUNSELING:   Reviewed preventive health counseling, as reflected in patient instructions  Special attention given to:        Regular exercise       Healthy diet/nutrition       Immunizations  Vaccinated for: Covid-19, Human Papillomavirus, and Influenza         Alcohol Use        Safe sex practices/STD prevention    BMI:   Estimated body mass index is 40.82 kg/m  as calculated from the following:    Height as of this encounter: 1.905 m (6' 3\").    Weight as of this encounter: 148.1 kg (326 lb 9.6 oz).   Weight management plan: Discussed healthy diet and exercise guidelines    Ned Lugo reports that Ned Lugo has been smoking cigarettes. Ned Lugo has been smoking an average of .5 packs per day. Ned Lugo has never used smokeless tobacco.  Nicotine/Tobacco Cessation Plan:   Information offered: Patient not interested at this time          RANDA Marie CNP  [facility]  "

## 2023-12-14 NOTE — COMMUNITY RESOURCES LIST (ENGLISH)
12/14/2023   [facility]  N/A  For questions about this resource list or additional care needs, please contact your primary care clinic or care manager.  Phone: [phone]   Email: N/A   Address: [address] [place] [address]   Hours: N/A        Food and Nutrition       Food pantry  1  [facility] - Emergency Assistance Program (EAP) - Food Distance: 1.16 miles      In-Person, Phone/Virtual   [address] [place] [address]  Language: American Sign Language, English  Hours: Mon - Fri 8:00 AM - 4:00 PM  Fees: Free   Phone: (637) 950-6357 Email: CENTRI Technology@Action [employer] InternationalSan Luis Valley Regional Medical CenterTelerivet Website: https://www.Boke/residents#human-services     2  Community Emergency Assistance Programs (CEAP) - [facility] Market Distance: 1.16 miles      Pickup   [address] [place] [address]  Language: English, Citizen of [place]  Hours: Mon - Tue 9:00 AM - 4:30 PM , Wed 9:00 AM - 7:00 PM , Thu 9:00 AM - 4:30 PM  Fees: Free   Phone: (451) 408-7136 Email: info@[employer] Website: http://www.cea.org     SNAP application assistance  3  [facility] Distance: 1.16 miles      In-Person, Phone/Virtual   [address] [place] [address]  Language: American Sign Language, English  Hours: Mon - Fri 8:00 AM - 4:00 PM  Fees: Free   Phone: (357) 962-5783 Email: serviceHacemeUnRegalo.comerinInformation Assurance@Action Products InternationalSan Luis Valley Regional Medical CenterTelerivet Website: https://www.Boke/residents#human-services     4  [facility] Distance: 1.68 miles      Phone/Virtual   [address] [place] [address]  Language: English, Citizen of [place]  Hours: Mon 9:00 AM - 1:00 PM , Tue - Thu 9:00 AM - 4:00 PM , Fri 9:00 AM - 1:00 PM  Fees: Free   Phone: (549) 983-4094 Email: info@[employer]VESA.org Website: http://www.AUM CardiovasculararPhoneplust.org/     Soup kitchen or free meals  5  [facility] - Serving Up  Nutrition Now for Youth Distance: 1.07 miles      Pickup   [address] [place] [address]  Language: English  Hours: Mon 11:30 AM - 1:30 PM , Wed 11:30 AM - 1:30 PM , Fri 11:30 AM - 1:30 PM  Fees: Free   Phone: (164) 616-2583 Ext.107 Email: admin@[facility] Website: http://[facility].org     6  [facility] [employer] - Loaves and Fishes - Loaves and Fishes Distance: 4.46 miles      [facility] Hospital[facility]   [address] [place] [address]  Language: English  Hours: Wed 5:30 PM - 6:30 PM  Fees: Free   Phone: (390) 451-3253 Email: migel@[facility].51credit.com Website: https://www.iexerci.se.51credit.com/          Important Numbers & Websites       Emergency Services   911  [facility] Services   311  Poison Control   (627) 632-1975  Suicide Prevention Lifeline   (917) 957-3132 (TALK)  Child Abuse Hotline   (319) 171-4418 (4-A-Child)  Sexual Assault Hotline   (303) 454-6999 (HOPE)  National Runaway Safeline   (865) 679-6237 (RUNAWAY)  All-Options Talkline   (444) 552-9175  Substance Abuse Referral   (299) 593-4093 (HELP)

## 2023-12-14 NOTE — COMMUNITY RESOURCES LIST (ENGLISH)
12/14/2023   [facility]  N/A  For questions about this resource list or additional care needs, please contact your primary care clinic or care manager.  Phone: [phone]   Email: N/A   Address: [address] [place] [address]   Hours: N/A        Food and Nutrition       Food pantry  1  [facility] - Emergency Assistance Program (EAP) - Food Distance: 1.16 miles      In-Person, Phone/Virtual   [address] [place] [address]  Language: American Sign Language, English  Hours: Mon - Fri 8:00 AM - 4:00 PM  Fees: Free   Phone: (936) 684-3229 Email: Thumbs Up@Care ITSt. Anthony Summit Medical CenterFramedia Advertising Website: https://www.Apperian/residents#human-services     2  Community Emergency Assistance Programs (CEAP) - [facility] - [facility] Market Distance: 1.16 miles      Pickup   [address] [place] [address]  Language: English, Comoran  Hours: Mon - Tue 9:00 AM - 4:30 PM , Wed 9:00 AM - 7:00 PM , Thu 9:00 AM - 4:30 PM  Fees: Free   Phone: (602) 582-3581 Email: info@[employer] Website: http://www.cea.org     SNAP application assistance  3  [facility] Distance: 1.16 miles      In-Person, Phone/Virtual   [address] [place] [address]  Language: American Sign Language, English  Hours: Mon - Fri 8:00 AM - 4:00 PM  Fees: Free   Phone: (134) 780-2031 Email: serviceJusperinVibes@Care ITSt. Anthony Summit Medical CenterFramedia Advertising Website: https://www.Apperian/residents#human-services     4  [facility] Distance: 1.68 miles      Phone/Virtual   [address] [place] [address]  Language: English, Comoran  Hours: Mon 9:00 AM - 1:00 PM , Tue - Thu 9:00 AM - 4:00 PM , Fri 9:00 AM - 1:00 PM  Fees: Free   Phone: (469) 417-1741 Email: info@[employer].org Website: http://www.CasengoarVirtual Bridgest.org/     Soup kitchen or free meals  5  [facility] - Serving Up  Nutrition Now for Youth Distance: 1.07 miles      Pickup   [address] [place] [address]  Language: English  Hours: Mon 11:30 AM - 1:30 PM , Wed 11:30 AM - 1:30 PM , Fri 11:30 AM - 1:30 PM  Fees: Free   Phone: (504) 528-2036 Ext.107 Email: admin@[facility] Website: http://[facility].org     6  [facility] Pentec[employer]al - [place]aves and Fishes - Loaves and Fishes Distance: 4.46 miles      [facility]   [address] [place] [address]  Language: English  Hours: Wed 5:30 PM - 6:30 PM  Fees: Free   Phone: (916) 900-1762 Email: migel@[facility]joySelect [facility].[employer]byME Website: https://www.CurbStand.IDbyME/          Important Numbers & Websites       Emergency Services   911  [facility] Services   311  Poison Control   (432) 482-6923  Suicide Prevention Lifeline   (251) 489-3933 (TALK)  Child Abuse Hotline   (567) 176-1305 (4-A-Child)  Sexual Assault Hotline   (765) 279-8790 (HOPE)  National Runaway Safeline   (894) 336-7773 (RUNAWAY)  All-Options Talkline   (143) 210-5417  Substance Abuse Referral   (217) 739-1822 (HELP)

## 2023-12-14 NOTE — NURSING NOTE
Patient Quality Outreach    Patient is due for the following:       Topic Date Due    Pneumococcal Vaccine (1 - PCV) Never done    Hepatitis A Vaccine (1 of 2 - Risk 2-dose series) Never done    HPV Vaccine (3 - 3-dose series) 04/20/2022    Flu Vaccine (1) 09/01/2023    COVID-19 Vaccine (4 - 2023-24 season) 09/01/2023       Next Steps:   Patient declined follow up at this time.    Type of outreach:    Spoke with patient at appointment      Questions for provider review:    None           Sherri Quiroga MA

## 2024-01-22 ENCOUNTER — MYC REFILL (OUTPATIENT)
Dept: FAMILY MEDICINE | Facility: CLINIC | Age: 29
End: 2024-01-22
Payer: COMMERCIAL

## 2024-01-22 DIAGNOSIS — F90.0 ATTENTION DEFICIT HYPERACTIVITY DISORDER (ADHD), PREDOMINANTLY INATTENTIVE TYPE: ICD-10-CM

## 2024-01-23 RX ORDER — DEXTROAMPHETAMINE SACCHARATE, AMPHETAMINE ASPARTATE, DEXTROAMPHETAMINE SULFATE AND AMPHETAMINE SULFATE 5; 5; 5; 5 MG/1; MG/1; MG/1; MG/1
20 TABLET ORAL DAILY
Qty: 30 TABLET | Refills: 0 | Status: SHIPPED | OUTPATIENT
Start: 2024-02-23

## 2024-01-23 RX ORDER — DEXTROAMPHETAMINE SACCHARATE, AMPHETAMINE ASPARTATE, DEXTROAMPHETAMINE SULFATE AND AMPHETAMINE SULFATE 2.5; 2.5; 2.5; 2.5 MG/1; MG/1; MG/1; MG/1
10 TABLET ORAL DAILY
Qty: 30 TABLET | Refills: 0 | Status: SHIPPED | OUTPATIENT
Start: 2024-01-23

## 2024-02-01 ENCOUNTER — MYC REFILL (OUTPATIENT)
Dept: FAMILY MEDICINE | Facility: CLINIC | Age: 29
End: 2024-02-01
Payer: COMMERCIAL

## 2024-02-01 DIAGNOSIS — F90.0 ATTENTION DEFICIT HYPERACTIVITY DISORDER (ADHD), PREDOMINANTLY INATTENTIVE TYPE: ICD-10-CM

## 2024-02-01 RX ORDER — DEXTROAMPHETAMINE SACCHARATE, AMPHETAMINE ASPARTATE, DEXTROAMPHETAMINE SULFATE AND AMPHETAMINE SULFATE 5; 5; 5; 5 MG/1; MG/1; MG/1; MG/1
20 TABLET ORAL DAILY
Qty: 30 TABLET | Refills: 0 | OUTPATIENT
Start: 2024-02-01

## 2024-02-01 RX ORDER — DEXTROAMPHETAMINE SACCHARATE, AMPHETAMINE ASPARTATE, DEXTROAMPHETAMINE SULFATE AND AMPHETAMINE SULFATE 2.5; 2.5; 2.5; 2.5 MG/1; MG/1; MG/1; MG/1
10 TABLET ORAL DAILY
Qty: 30 TABLET | Refills: 0 | OUTPATIENT
Start: 2024-02-01

## 2024-03-05 ENCOUNTER — OFFICE VISIT (OUTPATIENT)
Dept: SLEEP MEDICINE | Facility: CLINIC | Age: 29
End: 2024-03-05
Payer: COMMERCIAL

## 2024-03-05 VITALS
WEIGHT: 315 LBS | HEIGHT: 75 IN | BODY MASS INDEX: 39.17 KG/M2 | OXYGEN SATURATION: 95 % | DIASTOLIC BLOOD PRESSURE: 85 MMHG | SYSTOLIC BLOOD PRESSURE: 144 MMHG | HEART RATE: 102 BPM | RESPIRATION RATE: 20 BRPM

## 2024-03-05 DIAGNOSIS — F98.8 ADD (ATTENTION DEFICIT DISORDER) WITHOUT HYPERACTIVITY: ICD-10-CM

## 2024-03-05 DIAGNOSIS — G47.8 UNREFRESHED BY SLEEP: ICD-10-CM

## 2024-03-05 DIAGNOSIS — R06.83 SNORING: ICD-10-CM

## 2024-03-05 DIAGNOSIS — E66.01 MORBID OBESITY (H): ICD-10-CM

## 2024-03-05 DIAGNOSIS — G47.9 SLEEP DISTURBANCE: Primary | ICD-10-CM

## 2024-03-05 DIAGNOSIS — F41.9 ANXIETY AND DEPRESSION: ICD-10-CM

## 2024-03-05 DIAGNOSIS — G47.19 EXCESSIVE DAYTIME SLEEPINESS: ICD-10-CM

## 2024-03-05 DIAGNOSIS — F32.A ANXIETY AND DEPRESSION: ICD-10-CM

## 2024-03-05 DIAGNOSIS — Z72.821 INADEQUATE SLEEP HYGIENE: ICD-10-CM

## 2024-03-05 PROCEDURE — 99205 OFFICE O/P NEW HI 60 MIN: CPT | Performed by: INTERNAL MEDICINE

## 2024-03-05 RX ORDER — ALBUTEROL SULFATE 90 UG/1
1 AEROSOL, METERED RESPIRATORY (INHALATION) EVERY 4 HOURS PRN
COMMUNITY

## 2024-03-05 ASSESSMENT — SLEEP AND FATIGUE QUESTIONNAIRES
HOW LIKELY ARE YOU TO NOD OFF OR FALL ASLEEP WHILE SITTING INACTIVE IN A PUBLIC PLACE: MODERATE CHANCE OF DOZING
HOW LIKELY ARE YOU TO NOD OFF OR FALL ASLEEP WHILE SITTING QUIETLY AFTER LUNCH WITHOUT ALCOHOL: MODERATE CHANCE OF DOZING
HOW LIKELY ARE YOU TO NOD OFF OR FALL ASLEEP WHILE SITTING AND TALKING TO SOMEONE: SLIGHT CHANCE OF DOZING
HOW LIKELY ARE YOU TO NOD OFF OR FALL ASLEEP WHEN YOU ARE A PASSENGER IN A CAR FOR AN HOUR WITHOUT A BREAK: HIGH CHANCE OF DOZING
HOW LIKELY ARE YOU TO NOD OFF OR FALL ASLEEP WHILE SITTING AND READING: HIGH CHANCE OF DOZING
HOW LIKELY ARE YOU TO NOD OFF OR FALL ASLEEP WHILE WATCHING TV: HIGH CHANCE OF DOZING
HOW LIKELY ARE YOU TO NOD OFF OR FALL ASLEEP IN A CAR, WHILE STOPPED FOR A FEW MINUTES IN TRAFFIC: SLIGHT CHANCE OF DOZING
HOW LIKELY ARE YOU TO NOD OFF OR FALL ASLEEP WHILE LYING DOWN TO REST IN THE AFTERNOON WHEN CIRCUMSTANCES PERMIT: MODERATE CHANCE OF DOZING
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
## 2024-03-05 NOTE — PATIENT INSTRUCTIONS
"          MY TREATMENT INFORMATION FOR SLEEP APNEA-  Ned Lugo    DOCTOR : Marjorie Casey MD      Am I having a home sleep study?  --->Watch the video for the device you are using:    -/drop off device-   https://www.Zeto.com/watch?v=yGGFBdELGhk    Frequently asked questions:  1. What is Obstructive Sleep Apnea (JUICE)? JUICE is the most common type of sleep apnea. Apnea means, \"without breath.\"  Apnea is most often caused by narrowing or collapse of the upper airway as muscles relax during sleep.   Almost everyone has occasional apneas. Most people with sleep apnea have had brief interruptions at night frequently for many years.  The severity of sleep apnea is related to how frequent and severe the events are.   2. What are the consequences of JUICE? Symptoms include: feeling sleepy during the day, snoring loudly, gasping or stopping of breathing, trouble sleeping, and occasionally morning headaches or heartburn at night.  Sleepiness can be serious and even increase the risk of falling asleep while driving. Other health consequences may include development of high blood pressure and other cardiovascular disease in persons who are susceptible. Untreated JUICE  can contribute to heart disease, stroke and diabetes.   3. What are the treatment options? In most situations, sleep apnea is a lifelong disease that must be managed with daily therapy. Medications are not effective for sleep apnea and surgery is generally not considered until other therapies have been tried. Your treatment is your choice . Continuous Positive Airway (CPAP) works right away and is the therapy that is effective in nearly everyone. An oral device to hold your jaw forward is usually the next most reliable option. Other options include postioning devices (to keep you off your back), weight loss, and surgery including a tongue pacing device. There is more detail about some of these options below.  4. Are my sleep " studies covered by insurance? Although we will request verification of coverage, we advise you also check in advance of the study to ensure there is coverage.    Important tips for those choosing CPAP and similar devices  For new devices, sign up for device BERNARDO to monitor your device for your followup visits  We encourage you to utilize the [employer] bernardo or website (myAir web (resmed.com) ) to monitor your therapy progress and share the data with your healthcare team when you discuss your sleep apnea.                                                    Know your equipment:  CPAP is continuous positive airway pressure that prevents obstructive sleep apnea by keeping the throat from collapsing while you are sleeping. In most cases, the device is  smart  and can slowly self-adjusts if your throat collapses and keeps a record every day of how well you are treated-this information is available to you and your care team.  BPAP is bilevel positive airway pressure that keeps your throat open and also assists each breath with a pressure boost to maintain adequate breathing.  Special kinds of BPAP are used in patients who have inadequate breathing from lung or heart disease. In most cases, the device is  smart  and can slowly self-adjusts to assist breathing. Like CPAP, the device keeps a record of how well you are treated.  Your mask is your connection to the device. You get to choose what feels most comfortable and the staff will help to make sure if fits. Here: are some examples of the different masks that are available: Magnetic mask aids may assist with use but there are safety issues that should be addressed when considering with magnets* ( see end of discussion).       Key points to remember on your journey with sleep apnea:  Sleep study.  PAP devices often need to be adjusted during a sleep study to show that they are effective and adjusted right.  Good tips to remember: Try wearing just the mask during a quiet  time during the day so your body adapts to wearing it. A humidifier is recommended for comfort in most cases to prevent drying of your nose and throat. Allergy medication from your provider may help you if you are having nasal congestion.  Getting settled-in. It takes more than one night for most of us to get used to wearing a mask. Try wearing just the mask during a quiet time during the day so your body adapts to wearing it. A humidifier is recommended for comfort in most cases. Our team will work with you carefully on the first day and will be in contact within 4 days and again at 2 and 4 weeks for advice and remote device adjustments. Your therapy is evaluated by the device each day.   Use it every night. The more you are able to sleep naturally for 7-8 hours, the more likely you will have good sleep and to prevent health risks or symptoms from sleep apnea. Even if you use it 4 hours it helps. Occasionally all of us are unable to use a medical therapy, in sleep apnea, it is not dangerous to miss one night.   Communicate. Call our skilled team on the number provided on the first day if your visit for problems that make it difficult to wear the device. Over 2 out of 3 patients can learn to wear the device long-term with help from our team. Remember to call our team or your sleep providers if you are unable to wear the device as we may have other solutions for those who cannot adapt to mask CPAP therapy. It is recommended that you sleep your sleep provider within the first 3 months and yearly after that if you are not having problems.   Use it for your health. We encourage use of CPAP masks during daytime quiet periods to allow your face and brain to adapt to the sensation of CPAP so that it will be a more natural sensation to awaken to at night or during naps. This can be very useful during the first few weeks or months of adapting to CPAP though it does not help medically to wear CPAP during wakefulness and   should not be used as a strategy just to meet guidelines.  Take care of your equipment. Make sure you clean your mask and tubing using directions every day and that your filter and mask are replaced as recommended or if they are not working.     *Masks with magnets:  Updated Contraindications  Masks with magnetic components are contraindicated for use by patients where they, or anyone in close physical contact while using the mask, have the following:   Active medical implants that interact with magnets (i.e., pacemakers, implantable cardioverter defibrillators (ICD), neurostimulators, cerebrospinal fluid (CSF) shunts, insulin/infusion pumps)   Metallic implants/objects containing ferromagnetic material (i.e., aneurysm clips/flow disruption devices, embolic coils, stents, valves, electrodes, implants to restore hearing or balance with implanted magnets, ocular implants, metallic splinters in the eye)  Updated Warning  Keep the mask magnets at a safe distance of at least 6 inches (150 mm) away from implants or medical devices that may be adversely affected by magnetic interference. This warning applies to you or anyone in close physical contact with your mask. The magnets are in the frame and lower headgear clips, with a magnetic field strength of up to 400mT. When worn, they connect to secure the mask but may inadvertently detach while asleep.  Implants/medical devices, including those listed within contraindications, may be adversely affected if they change function under external magnetic fields or contain ferromagnetic materials that attract/repel to magnetic fields (some metallic implants, e.g., contact lenses with metal, dental implants, metallic cranial plates, screws, shazia hole covers, and bone substitute devices). Consult your physician and  of your implant / other medical device for information on the potential adverse effects of magnetic fields.    BESIDES CPAP, WHAT OTHER THERAPIES ARE  THERE?    Positioning Device  Positioning devices are generally used when sleep apnea is mild and only occurs on your back.This example shows a pillow that straps around the waist. It may be appropriate for those whose sleep study shows milder sleep apnea that occurs primarily when lying flat on one's back. Preliminary studies have shown benefit but effectiveness at home may need to be verified by a home sleep test. These devices are generally not covered by medical insurance.  Examples of devices that maintain sleeping on the back to prevent snoring and mild sleep apnea.    Belt type body positioner  http://[employer]/    Electronic reminder  http://nightshifttherapy.com/            Oral Appliance  What is oral appliance therapy?  An oral appliance device fits on your teeth at night like a retainer used after having braces. The device is made by a specialized dentist and requires several visits over 1-2 months before a manufactured device is made to fit your teeth and is adjusted to prevent your sleep apnea. Once an oral device is working properly, snoring should be improved. A home sleep test may be recommended at that time if to determine whether the sleep apnea is adequately treated.       Some things to remember:  -Oral devices are often, but not always, covered by your medical insurance. Be sure to check with your insurance provider.   -If you are referred for oral therapy, you will be given a list of specialized dentists to consider or you may choose to visit the Web site of the American Academy of Dental Sleep Medicine  -Oral devices are less likely to work if you have severe sleep apnea or are extremely overweight.     More detailed information  An oral appliance is a small acrylic device that fits over the upper and lower teeth  (similar to a retainer or a mouth guard). This device slightly moves jaw forward, which moves the base of the tongue forward, opens the airway, improves breathing for effective  treat snoring and obstructive sleep apnea in perhaps 7 out of 10 people .  The best working devices are custom-made by a dental device  after a mold is made of the teeth 1, 2, 3.  When is an oral appliance indicated?  Oral appliance therapy is recommended as a first-line treatment for patients with primary snoring, mild sleep apnea, and for patients with moderate sleep apnea who prefer appliance therapy to use of CPAP4, 5. Severity of sleep apnea is determined by sleep testing and is based on the number of respiratory events per hour of sleep.   How successful is oral appliance therapy?  The success rate of oral appliance therapy in patients with mild sleep apnea is 75-80% while in patients with moderate sleep apnea it is 50-70%. The chance of success in patients with severe sleep apnea is 40-50%. The research also shows that oral appliances have a beneficial effect on the cardiovascular health of JUICE patients at the same magnitude as CPAP therapy7.  Oral appliances should be a second-line treatment in cases of severe sleep apnea, but if not completely successful then a combination therapy utilizing CPAP plus oral appliance therapy may be effective. Oral appliances tend to be effective in a broad range of patients although studies show that the patients who have the highest success are females, younger patients, those with milder disease, and less severe obesity. 3, 6.   Finding a dentist that practices dental sleep medicine  Specific training is available through the American Academy of Dental Sleep Medicine for dentists interested in working in the field of sleep. To find a dentist who is educated in the field of sleep and the use of oral appliances, near you, visit the Web site of the American Academy of Dental Sleep Medicine.    References  1. Wolfgang et al. Objectively measured vs self-reported compliance during oral appliance therapy for sleep-disordered breathing. Chest 2013; 144(5):  4810-3794.  2. Griselda et al. Objective measurement of compliance during oral appliance therapy for sleep-disordered breathing. Thorax 2013; 68(1): 91-96.  3. Lonny et al. Mandibular advancement devices in 620 men and women with JUICE and snoring: tolerability and predictors of treatment success. Chest 2004; 125: 7355-2523.  4. Renaldo, et al. Oral appliances for snoring and JUICE: a review. Sleep 2006; 29: 244-262.  5. Cheryl et al. Oral appliance treatment for JUICE: an update. J Clin Sleep Med 2014; 10(2): 215-227.  6. Angeline et al. Predictors of OSAH treatment outcome. J Dent Res 2007; 86: 3484-5372.      Weight Loss:   Your Body mass index is 41.18 kg/m .    Being overweight does not necessarily mean you will have health consequences.  Those who have BMI over 35 or over 27 with existing medical conditions carries greater risk.   Weight loss decreases severity of sleep apnea in most people with obesity. For those with mild obesity who have developed snoring with weight gain, even 15-30 pound weight loss can improve and occasionally milder eliminate sleep apnea.  Structured and life-long dietary and health habits are necessary to lose weight and keep healthier weight levels.     The Comprehensive Weight loss program offers all aspects of weight loss strategies including two Non-Surgical Weight Loss Programs: Medical Weight Management and our 24 Week Healthy Lifestyle Program:    Medical Weight Management: You will meet with a Medical Weight Management Provider, as well as a Registered Dietician. The program may include medication therapy, dietary education, recommended exercise and physical therapy programs, monthly support group meetings, and possible psychological counseling. Follow up visits with the provider or dietician are scheduled based on your progress and needs.    24 Week Healthy Lifestyle Program: This unique program is designed to give you the support of weekly appointments and activities  thru a 24-week period. It may include all of the components of the basic program (above), with the addition of 11 individual Health  Visits, 24-week access to the [employer] website for over 700 online classes, and monthly support group meetings. This program has an out-of-pocket expense of $499 to cover the items that can not be billed to insurance (health coaches and [employer] access), and is non-refundable/non-transferable (you may be able to use a Health Savings Account; ask your HSA provider). There may be an optional meal replacement plan prescribed as well.   Surgical management achieves meaningful long-term weight loss and improvement in health risks in most patients with more severe obesity.      Sleep Apnea Surgery:    Surgery for obstructive sleep apnea is considered generally only when other therapies fail to work. Surgery may be discussed with you if you are having a difficult time tolerating CPAP and or when there is an abnormal structure that requires surgical correction.  Nose and throat surgeries often enlarge the airway to prevent collapse.  Most of these surgeries create pain for 1-2 weeks and up to half of the most common surgeries are not effective throughout life.  You should carefully discuss the benefits and drawbacks to surgery with your sleep provider and surgeon to determine if it is the best solution for you.   More information  Surgery for JUICE is directed at areas that are responsible for narrowing or complete obstruction of the airway during sleep.  There are a wide range of procedures available to enlarge and/or stabilize the airway to prevent blockage of breathing in the three major areas where it can occur: the palate, tongue, and nasal regions.  Successful surgical treatment depends on the accurate identification of the factors responsible for obstructive sleep apnea in each person.  A personalized approach is required because there is no single treatment that works well for  everyone.  Because of anatomic variation, consultation with an examination by a sleep surgeon is a critical first step in determining what surgical options are best for each patient.  In some cases, examination during sedation may be recommended in order to guide the selection of procedures.  Patients will be counseled about risks and benefits as well as the typical recovery course after surgery. Surgery is typically not a cure for a person s JUICE.  However, surgery will often significantly improve one s JUICE severity (termed  success rate ).  Even in the absence of a cure, surgery will decrease the cardiovascular risk associated with OSA7; improve overall quality of life8 (sleepiness, functionality, sleep quality, etc).      Palate Procedures:  Patients with JUICE often have narrowing of their airway in the region of their tonsils and uvula.  The goals of palate procedures are to widen the airway in this region as well as to help the tissues resist collapse.  Modern palate procedure techniques focus on tissue conservation and soft tissue rearrangement, rather than tissue removal.  Often the uvula is preserved in this procedure. Residual sleep apnea is common in patient after pharyngoplasty with an average reduction in sleep apnea events of 33%2.      Tongue Procedures:  ExamWhile patients are awake, the muscles that surround the throat are active and keep this region open for breathing. These muscles relax during sleep, allowing the tongue and other structures to collapse and block breathing.  There are several different tongue procedures available.  Selection of a tongue base procedure depends on characteristics seen on physical exam.  Generally, procedures are aimed at removing bulky tissues in this area or preventing the back of the tongue from falling back during sleep.  Success rates for tongue surgery range from 50-62%3.    Hypoglossal Nerve Stimulation:  Hypoglossal nerve stimulation has recently received  approval from the [place] Food and Drug Administration for the treatment of obstructive sleep apnea.  This is based on research showing that the system was safe and effective in treating sleep apnea6.  Results showed that the median AHI score decreased 68%, from 29.3 to 9.0. This therapy uses an implant system that senses breathing patterns and delivers mild stimulation to airway muscles, which keeps the airway open during sleep.  The system consists of three fully implanted components: a small generator (similar in size to a pacemaker), a breathing sensor, and a stimulation lead.  Using a small handheld remote, a patient turns the therapy on before bed and off upon awakening.    Candidates for this device must be greater than 18 years of age, have moderate to severe obstructive sleep apnea with less than 25% central events  (AHI between 15-65), BMI less than 35, have tried CPAP/oral appliance for at least 8 weeks without success, and have appropriate upper airway anatomy (determined by a sleep endoscopy performed by Dr. Dmitriy Au or Dr. Cristi Hill).    Nasal Procedures:  Nasal obstruction can interfere with nasal breathing during the day and night.  Studies have shown that relief of nasal obstruction can improve the ability of some patients to tolerate positive airway pressure therapy for obstructive sleep apnea1.  Treatment options include medications such as nasal saline, topical corticosteroid and antihistamine sprays, and oral medications such as antihistamines or decongestants. Non-surgical treatments can include external nasal dilators for selected patients. If these are not successful by themselves, surgery can improve the nasal airway either alone or in combination with these other options.        Combination Procedures:  Combination of surgical procedures and other treatments may be recommended, particularly if patients have more than one area of narrowing or persistent positional disease.  The  success rate of combination surgery ranges from 66-80%2,3.    References  Juan Alberto ACE. The Role of the Nose in Snoring and Obstructive Sleep Apnoea: An Update.  Eur Arch Otorhinolaryngol. 2011; 268: 1365-73.   Luli SM; Bill JA; Malu JR; Pallanch JF; Marianne MB; Roel SG; Hernandez SMART. Surgical modifications of the upper airway for obstructive sleep apnea in adults: a systematic review and meta-analysis. SLEEP 2010;33(10):3181-3132. Maria Luz ACEVES. Hypopharyngeal surgery in obstructive sleep apnea: an evidence-based medicine review.  Arch Otolaryngol Head Neck Surg. 2006 Feb;132(2):206-13.  Boom YH1, Danilo Y, Luis MARGARITO. The efficacy of anatomically based multilevel surgery for obstructive sleep apnea. Otolaryngol Head Neck Surg. 2003 Oct;129(4):327-35.  Maria Luz ACEVES, Goldberg A. Hypopharyngeal Surgery in Obstructive Sleep Apnea: An Evidence-Based Medicine Review. Arch Otolaryngol Head Neck Surg. 2006 Feb;132(2):206-13.  Ace PJ et al. Upper-Airway Stimulation for Obstructive Sleep Apnea.  N Engl J Med. 2014 Jan 9;370(2):139-49.  Ernesto Y et al. Increased Incidence of Cardiovascular Disease in Middle-aged Men with Obstructive Sleep Apnea. Am J Respir Crit Care Med; 2002 166: 159-165  Adkins EM et al. Studying Life Effects and Effectiveness of Palatopharyngoplasty (SLEEP) study: Subjective Outcomes of Isolated Uvulopalatopharyngoplasty. Otolaryngol Head Neck Surg. 2011; 144: 623-631.        WHAT IF I ONLY HAVE SNORING?    Mandibular advancement devices, lateral sleep positioning, long-term weight loss and treatment of nasal allergies have been shown to improve snoring.  Exercising tongue muscles with a game (https://apps.[employer]/us/bernardo/soundly-reduce-snoring/zk4898404757) or stimulating the tongue during the day with a device (https://doi.org/10.3390/xcw84420229) have improved snoring in some  individuals.  https://www.[employer]/  https://www.sleepfoundation.org/best-anti-snoring-mouthpieces-and-mouthguards    Remember to Drive Safe... Drive Alive     Sleep health profoundly affects your health, mood, and your safety.  Thirty three percent of the population (one in three of us) is not getting enough sleep and many have a sleep disorder. Not getting enough sleep or having an untreated / undertreated sleep condition may make us sleepy without even knowing it. In fact, our driving could be dramatically impaired due to our sleep health. As your provider, here are some things I would like you to know about driving:     Here are some warning signs for impairment and dangerous drowsy driving:              -Having been awake more than 16 hours               -Looking tired               -Eyelid drooping              -Head nodding (it could be too late at this point)              -Driving for more than 30 minutes     Some things you could do to make the driving safer if you are experiencing some drowsiness:              -Stop driving and rest              -Call for transportation              -Make sure your sleep disorder is adequately treated     Some things that have been shown NOT to work when experiencing drowsiness while driving:              -Turning on the radio              -Opening windows              -Eating any  distracting  /  entertaining  foods (e.g., sunflower seeds, candy, or any other)              -Talking on the phone      Your decision may not only impact your life, but also the life of others. Please, remember to drive safe for yourself and all of us.

## 2024-03-25 ENCOUNTER — OFFICE VISIT (OUTPATIENT)
Dept: SLEEP MEDICINE | Facility: CLINIC | Age: 29
End: 2024-03-25
Payer: COMMERCIAL

## 2024-03-25 DIAGNOSIS — G47.9 SLEEP DISTURBANCE: ICD-10-CM

## 2024-03-25 ASSESSMENT — SLEEP AND FATIGUE QUESTIONNAIRES
HOW LIKELY ARE YOU TO NOD OFF OR FALL ASLEEP WHILE SITTING INACTIVE IN A PUBLIC PLACE: MODERATE CHANCE OF DOZING
HOW LIKELY ARE YOU TO NOD OFF OR FALL ASLEEP WHILE WATCHING TV: HIGH CHANCE OF DOZING
HOW LIKELY ARE YOU TO NOD OFF OR FALL ASLEEP IN A CAR, WHILE STOPPED FOR A FEW MINUTES IN TRAFFIC: SLIGHT CHANCE OF DOZING
HOW LIKELY ARE YOU TO NOD OFF OR FALL ASLEEP WHILE SITTING AND READING: HIGH CHANCE OF DOZING
HOW LIKELY ARE YOU TO NOD OFF OR FALL ASLEEP WHEN YOU ARE A PASSENGER IN A CAR FOR AN HOUR WITHOUT A BREAK: HIGH CHANCE OF DOZING
HOW LIKELY ARE YOU TO NOD OFF OR FALL ASLEEP WHILE SITTING QUIETLY AFTER LUNCH WITHOUT ALCOHOL: SLIGHT CHANCE OF DOZING
HOW LIKELY ARE YOU TO NOD OFF OR FALL ASLEEP WHILE SITTING AND TALKING TO SOMEONE: MODERATE CHANCE OF DOZING
HOW LIKELY ARE YOU TO NOD OFF OR FALL ASLEEP WHILE LYING DOWN TO REST IN THE AFTERNOON WHEN CIRCUMSTANCES PERMIT: HIGH CHANCE OF DOZING

## 2024-03-25 NOTE — PROGRESS NOTES
Pt is completing a home sleep test. Pt was instructed on how to put on the Noxturnal T3 device and associated equipment before going to bed and given the opportunity to practice putting it on before leaving the sleep center. Pt was reminded to bring the home sleep test kit back to the center tomorrow, at agreed upon time for download and reporting.   Neck circumference: 43 CM / 17 inches.  Demi Florez CMA on 3/25/2024 at 12:54 PM

## 2024-03-26 ENCOUNTER — DOCUMENTATION ONLY (OUTPATIENT)
Dept: SLEEP MEDICINE | Facility: CLINIC | Age: 29
End: 2024-03-26
Payer: COMMERCIAL

## 2024-03-26 ENCOUNTER — DOCUMENTATION ONLY (OUTPATIENT)
Dept: SLEEP MEDICINE | Facility: CLINIC | Age: 29
End: 2024-03-26

## 2024-03-26 DIAGNOSIS — G47.33 OSA (OBSTRUCTIVE SLEEP APNEA): ICD-10-CM

## 2024-03-26 DIAGNOSIS — G47.36 HYPOXEMIA ASSOCIATED WITH SLEEP: Primary | ICD-10-CM

## 2024-03-26 PROCEDURE — 95800 SLP STDY UNATTENDED: CPT | Mod: 52 | Performed by: INTERNAL MEDICINE

## 2024-03-26 NOTE — PROGRESS NOTES
HST POST-STUDY QUESTIONNAIRE    What time did you go to bed?  2 am  How long do you think it took to fall asleep?  10-20 mins  What time did you wake up to start the day?  8:30  Did you get up during the night at all?  Once  If you woke up, do you remember approximately what time(s)? I think 3?  Did you have any difficulty with the equipment?  Yes I did wake up this morning to it not on my face/ finger.  Did you us any type of treatment with this study?  None  Was the head of the bed elevated? No  Did you sleep in a recliner?  No  Did you stop using CPAP at least 3 days before this test?  NA  Any other information you'd like us to know?

## 2024-03-30 NOTE — PROCEDURES
"HOME SLEEP STUDY INTERPRETATION        Patient: Ned Lugo  MRN: [id number]  YOB: 1995  Study Date: 3/26/2024  PCP/Referring Provider: No Ref-Primary, Physician  Ordering Provider: Marjorie Casey MD    Indications for Home Study: Ned Lugo is a 28 year old adult who presents with symptoms suggestive of obstructive sleep apnea.    Estimated body mass index is 41.18 kg/m  as calculated from the following:    Height as of 3/5/24: 1.905 m (6' 3\").    Weight as of 3/5/24: 149.5 kg (329 lb 8 oz).  Total score - [place]: 18 (3/25/2024  9:41 AM)  STOP-BAN/8      Data: A full night home sleep study was performed recording the standard physiologic parameters including body position, movement, sound, nasal pressure, thermal oral airflow, chest and abdominal movements with respiratory inductance plethysmography, and oxygen saturation by pulse oximetry. Pulse rate was estimated by oximetry recording. This study was considered adequate based on > 4 hours of quality oximetry and respiratory recording. As specified by the AASM Manual for the Scoring of Sleep and Associated events, version 2.3, Rule VIII.D 1B, 4% oxygen desaturation scoring for hypopneas is used as a standard of care on all home sleep apnea testing.      Analysis Time:  242.2 minutes      Respiration:   Sleep Associated Hypoxemia: sustained hypoxemia was present. Average oxygen saturation was 82%. Time with saturation less than or equal to 88% was 166.3 minutes. The lowest oxygen saturation was 57%.   Snoring: Snoring was present.  Respiratory events: The home study revealed a presence of 285 obstructive apneas and 82 mixed and central apneas. There were 59 hypopneas resulting in a combined apnea/hypopnea index [AHI] of 107.1 events per hour.  AHI was 100.6 per hour supine, 122.6 per hour prone, 122.7 per hour on left side, and 134.2 per hour on right side.   Pattern: Excluding events noted above, intermittent " periodicity in breathing pattern was noted without prolonged cycle length with predominantly obstructive events.      Position: Percent of time spent: supine - 72.1%, prone - 3.2%, on left - 14.7%, on right - 8.1%.      Heart Rate: By pulse oximetry, tachycardia was noted.       Assessment:   Severe obstructive sleep apnea was present with sleep associated hypoxemia.    Recommendations:  Consider auto-CPAP at 5-15 cmH2O or polysomnography with full night PAP titration using transcutaneous CO2 monitoring to establish the optimum treatment for the sleep related breathing disorder.  Suggest optimizing sleep hygiene and avoiding sleep deprivation.  Weight management.        Diagnosis Code(s): Obstructive Sleep Apnea G47.33, Hypoxemia G47.36, Snoring R06.83    Electronically signed by: Marjorie Casey MD, March 30, 2024   Diplomate, American Board of Internal Medicine, Sleep Medicine

## 2024-04-03 ENCOUNTER — DOCUMENTATION ONLY (OUTPATIENT)
Dept: SLEEP MEDICINE | Facility: CLINIC | Age: 29
End: 2024-04-03

## 2024-04-03 ENCOUNTER — DOCUMENTATION ONLY (OUTPATIENT)
Dept: SLEEP MEDICINE | Facility: CLINIC | Age: 29
End: 2024-04-03
Payer: COMMERCIAL

## 2024-04-03 DIAGNOSIS — G47.33 OBSTRUCTIVE SLEEP APNEA (ADULT) (PEDIATRIC): Primary | ICD-10-CM

## 2024-04-03 NOTE — PROGRESS NOTES
Patient was offered choice of vendor and chose [facility].  Patient Ned Lugo was set up at [facility]  on April 3, 2024. Patient received a Resmed Airsense 11 Pressures were set at  5-15 cm H2O.   Patient s ramp is 5 cm H2O for Auto and FLEX/EPR is EPR, 2.  Patient received a [employer] Mask name: VITERA  Full Face mask size Large, heated tubing and heated humidifier.  Patient has the following compliance requirements: none    David Woodward

## 2024-04-04 NOTE — PROGRESS NOTES
Prior to immunization administration, verified patients identity using patient s name and date of birth. Please see Immunization Activity for additional information.     Screening Questionnaire for Adult Immunization    Are you sick today?   No   Do you have allergies to medications, food, a vaccine component or latex?   No   Have you ever had a serious reaction after receiving a vaccination?   No   Do you have a long-term health problem with heart, lung, kidney, or metabolic disease (e.g., diabetes), asthma, a blood disorder, no spleen, complement component deficiency, a cochlear implant, or a spinal fluid leak?  Are you on long-term aspirin therapy?   No   Do you have cancer, leukemia, HIV/AIDS, or any other immune system problem?   No   Do you have a parent, brother, or sister with an immune system problem?   No   In the past 3 months, have you taken medications that affect  your immune system, such as prednisone, other steroids, or anticancer drugs; drugs for the treatment of rheumatoid arthritis, Crohn s disease, or psoriasis; or have you had radiation treatments?   No   Have you had a seizure, or a brain or other nervous system problem?   No   During the past year, have you received a transfusion of blood or blood    products, or been given immune (gamma) globulin or antiviral drug?   No   For women: Are you pregnant or is there a chance you could become       pregnant during the next month?   No   Have you received any vaccinations in the past 4 weeks?   No     Immunization questionnaire answers were all negative.      Patient instructed to remain in clinic for 15 minutes afterwards, and to report any adverse reactions.     Screening performed by Sherri Quiroga MA on 12/14/2023 at 8:28 AM.        Quality 130: Documentation Of Current Medications In The Medical Record: Current Medications Documented Quality 431: Preventive Care And Screening: Unhealthy Alcohol Use - Screening: Patient not identified as an unhealthy alcohol user when screened for unhealthy alcohol use using a systematic screening method Quality 226: Preventive Care And Screening: Tobacco Use: Screening And Cessation Intervention: Patient screened for tobacco use and is an ex/non-smoker Detail Level: Detailed

## 2024-04-08 ENCOUNTER — DOCUMENTATION ONLY (OUTPATIENT)
Dept: SLEEP MEDICINE | Facility: CLINIC | Age: 29
End: 2024-04-08
Payer: COMMERCIAL

## 2024-04-08 NOTE — PROGRESS NOTES
3 day Sleep therapy management telephone visit    Diagnostic AHI: HST: 107.1        Confirmed with patient at time of call- Yes Patient is still interested in STM service       Subjective measures:  Patient slowly getting used to CPAP he has no questions or concerns.          Objective data     Order Settings for PAP  CPAP min     CPAP max              Device settings from machine CPAP min 5.0     CPAP max 15.0           EPR Setting TWO    RESMED soft response  OFF     Assessment: Nightly usage most nights under four hours       Patient has the following upcoming sleep appts:  Future Sleep Appointments         Provider Department    5/1/2024 10:30 AM (Arrive by 10:15 AM) Marjorie Casey MD [facility] [place]            Replacement device: No  STM ordered by provider: Yes     Total time spent on accessing and  interpreting remote patient PAP therapy data  10 minutes    Total time spent counseling, coaching  and reviewing PAP therapy data with patient  3 minutes    [id number] no

## 2024-04-18 ENCOUNTER — DOCUMENTATION ONLY (OUTPATIENT)
Dept: SLEEP MEDICINE | Facility: CLINIC | Age: 29
End: 2024-04-18
Payer: COMMERCIAL

## 2024-04-18 ENCOUNTER — TELEPHONE (OUTPATIENT)
Dept: SLEEP MEDICINE | Facility: CLINIC | Age: 29
End: 2024-04-18

## 2024-04-18 DIAGNOSIS — G47.33 OSA ON CPAP: Primary | ICD-10-CM

## 2024-04-18 NOTE — PROGRESS NOTES
14  DAY STM VISIT    Diagnostic AHI:  HST: 107.1        Subjective measures: Pt reports he is doing good with CPAP. He feels benefit when he uses it but at times has to take it off due to high pressure. Reiviewed DL and he has a residual ahi of 15, some nights up to 20 that is likely contributing to his awakenings. Will put in for pressure change 5-17. Advised pt to restart CPAP instead of taking it off if he feels the pressure is too high.  Pt was given my contact information and instructed to reach out with any questions or concerns.       Assessment: Pt not meeting objective benchmarks for AHI and compliance  Patient failing following subjective benchmarks: pressure issues    Action plan: pt to have 30 day STM visit.      Device type: Auto-CPAP    PAP settings:  DEVICE TYPE CPAP MIN CPAP MAX 95TH % PRESSURE EPR MASK DISPENSED   Auto-CPAP    5.0 cm  H20 15.0 cm  H20 14.1 cm  H20  TWO Per patient choice     Mask type:  Per patient choice    Objective measures: 14 day rolling measures   COMPLIANCE LEAK AHI AVERAGE USE IN MINUTES   28 % 16.89 15.67 186   GOAL >70% GOAL < 24 LPM GOAL <5 GOAL >240      Patient has the following upcoming sleep appts:  Future Sleep Appointments         Provider Department    5/1/2024 10:30 AM (Arrive by 10:15 AM) Marjorie Casey MD [facility] [place]            Total time spent on accessing and interpreting remote patient PAP therapy data  10 minutes    Total time spent counseling, coaching  and reviewing PAP therapy data with patient  5 minutes    [id number]  [id number]  no (3 day STM)

## 2024-05-01 ENCOUNTER — VIRTUAL VISIT (OUTPATIENT)
Dept: SLEEP MEDICINE | Facility: CLINIC | Age: 29
End: 2024-05-01
Payer: COMMERCIAL

## 2024-05-01 VITALS — WEIGHT: 315 LBS | BODY MASS INDEX: 39.17 KG/M2 | HEIGHT: 75 IN

## 2024-05-01 DIAGNOSIS — G47.36 HYPOXEMIA ASSOCIATED WITH SLEEP: Primary | ICD-10-CM

## 2024-05-01 DIAGNOSIS — G47.33 OSA ON CPAP: ICD-10-CM

## 2024-05-01 DIAGNOSIS — E66.01 MORBID OBESITY (H): ICD-10-CM

## 2024-05-01 PROCEDURE — 99214 OFFICE O/P EST MOD 30 MIN: CPT | Mod: 95 | Performed by: INTERNAL MEDICINE

## 2024-05-01 ASSESSMENT — PAIN SCALES - GENERAL: PAINLEVEL: NO PAIN (0)

## 2024-05-01 ASSESSMENT — SLEEP AND FATIGUE QUESTIONNAIRES
HOW LIKELY ARE YOU TO NOD OFF OR FALL ASLEEP WHEN YOU ARE A PASSENGER IN A CAR FOR AN HOUR WITHOUT A BREAK: WOULD NEVER DOZE
HOW LIKELY ARE YOU TO NOD OFF OR FALL ASLEEP WHILE SITTING QUIETLY AFTER LUNCH WITHOUT ALCOHOL: WOULD NEVER DOZE
HOW LIKELY ARE YOU TO NOD OFF OR FALL ASLEEP WHILE WATCHING TV: SLIGHT CHANCE OF DOZING
HOW LIKELY ARE YOU TO NOD OFF OR FALL ASLEEP IN A CAR, WHILE STOPPED FOR A FEW MINUTES IN TRAFFIC: WOULD NEVER DOZE
HOW LIKELY ARE YOU TO NOD OFF OR FALL ASLEEP WHILE SITTING AND TALKING TO SOMEONE: WOULD NEVER DOZE
HOW LIKELY ARE YOU TO NOD OFF OR FALL ASLEEP WHILE SITTING AND READING: SLIGHT CHANCE OF DOZING
HOW LIKELY ARE YOU TO NOD OFF OR FALL ASLEEP WHILE LYING DOWN TO REST IN THE AFTERNOON WHEN CIRCUMSTANCES PERMIT: SLIGHT CHANCE OF DOZING
HOW LIKELY ARE YOU TO NOD OFF OR FALL ASLEEP WHILE SITTING INACTIVE IN A PUBLIC PLACE: WOULD NEVER DOZE

## 2024-05-01 NOTE — PROGRESS NOTES
Video-Visit Details    Type of service:  Video Visit    Video Visit Start Time: 1041am    Video End Time:1059am    Originating Location (pt. Location): Home      Distant Location (provider location):  Off-site     Platform used for Video Visit: [facility]  Sleep clinic follow-up visit note    Date: May 1, 2024     Chief complaint:  Follow-up of JUICE, review CPAP compliance measures     Ned Lugo is a 28 year old male who presents to sleep clinic for follow-up of previously diagnosed severe obstructive sleep apnea that is currently managed with CPAP therapy. HST obtained on 3/26/24 showed severe JUICE with hypoxemia.     He was set up at [place]  on April 3, 2024. Patient received a Resmed Airsense 11 Pressures were set at  5-15 cm H2O.   Patient s ramp is 5 cm H2O for Auto and FLEX/EPR is EPR, 2.  Patient received a [employer] Mask name: VITERA  Full Face mask size Large, heated tubing and heated humidifier     Previous sleep study report: (HSAT)  Study Date: 3/26/2024  Analysis Time:  242.2 minutes  Respiration:   Sleep Associated Hypoxemia: sustained hypoxemia was present. Average oxygen saturation was 82%. Time with saturation less than or equal to 88% was 166.3 minutes. The lowest oxygen saturation was 57%.   Snoring: Snoring was present.  Respiratory events: The home study revealed a presence of 285 obstructive apneas and 82 mixed and central apneas. There were 59 hypopneas resulting in a combined apnea/hypopnea index [AHI] of 107.1 events per hour.  AHI was 100.6 per hour supine, 122.6 per hour prone, 122.7 per hour on left side, and 134.2 per hour on right side.   Pattern: Excluding events noted above, intermittent periodicity in breathing pattern was noted without prolonged cycle length with predominantly obstructive events.  Position: Percent of time spent: supine - 72.1%, prone - 3.2%, on left - 14.7%, on right - 8.1%.  Heart Rate: By pulse oximetry, tachycardia was noted.    Assessment:   Severe obstructive sleep apnea was present with sleep associated hypoxemia.    Pressure settings on CPAP ranged between 5 to 17 cmH2O  Patient reports using the CPAP regularly during sleep, and he is still getting used to the device. After using it for 2-4 hours, he reports waking up in the middle of the night and taking the mask off. There are occasional reports of inadvertent mask removal.  He uses full face mask.   Patient sleeps alone so there is not anyone to report snoring or apneas with CPAP use.    There are no reports of awakenings due to gasping  with the device use.   He reports air hunger when he initially puts the mask on and the pressure settings feel strong during the middle of the night. Patient reports positive benefits  with the device use.   Patient reports waking up feeling comparatively more rested and notices improvement in his overall energy levels since starting CPAP therapy. He denies excessive daytime sleepiness.  ESS: 3 /24.  There are no reports of drowsiness while driving.       DOWNLOADABLE COMPLIANCE DATA:  ResMed   Auto-PAP 5.0 - 17.0 cmH2O 30 day usage data:    23% of days with > 4 hours of use. 3/30 days with no use.   Average use 155 minutes per day.   95%ile Leak 16.54 L/min.   CPAP 95% pressure 14.5 cm.   AHI 13 events per hour.      Past medical/surgical history, family history, social history, medications and allergies were reviewed.        Physical Examination:   General: Pleasant. Cooperative. In no apparent distress.  Pulmonary: Able to speak in full sentences easily. No cough or wheeze.   Neurologic: Alert, oriented x3.  Psychiatric: Mood euthymic. Affect congruent with full range and intensity.     ASSESSMENT/PLAN:  Obstructive sleep apnea: Pt reports reduced compliance with CPAP (still getting acclimated) and reports positive benefits with CPAP treatment. Based on compliance measures, residual AHI is <5 some nights and high some nights  at the current  "settings.    DME orders were generated for revision of the pressure settings on the auto CPAP to range 8 to 18 cm water based on the patient's concerns and compliance download and for renewal of CPAP supplies.  Recommend continue using the CPAP regularly during sleep and using the device for the entire duration of sleep to derive maximum benefit and getting the supplies for the CPAP replaced regularly.   He will be followed through STM program  Patient instructed to remember to bring PAP with him if hospitalized and if anticipating procedure that requires sedation/surgery to be sure to discuss with the provider/surgeon that he has sleep apnea and uses PAP therapy.    Sleep associated hypoxemia: Recommend obtaining an overnight oximetry with the auto CPAP device at the revised settings in approximately 1 month (to give him some more time to acclimate to the treatment) to check for resolution of the hypoxemia.  Orders for future overnight oximetry generated in epic.  Plan is to communicate the overnight oximetry test results with the patient via MY CHART.     Obesity: We discussed weight management with healthy diet, and exercise.     Patient was strongly advised to avoid driving, operating any heavy machinery or other hazardous situations while drowsy or sleepy.  Patient was counseled on the importance of driving while alert, to pull over if drowsy, or nap before getting into the vehicle if sleepy.      Follow-up with sleep clinic via video visit in 4-6 months to review CPAP compliance measures.     The above note was dictated using voice recognition software. Although reviewed after completion, some word and grammatical error may remain . Please contact the author for any clarifications.      \" Total time spent was 30 minutes for this appointment on this date of service which include time spent before, during and after the visit for chart review, patient care, counseling and coordination of care. Including " "documentation\"      Marjorie Casey MD  [facility]  [address] , [place] [address]   "

## 2024-05-01 NOTE — NURSING NOTE
Is the patient currently in the state of [place]? YES    Visit mode:VIDEO    If the visit is dropped, the patient can be reconnected by: VIDEO VISIT: Text to cell phone:   Telephone Information:   Mobile [phone]       Will anyone else be joining the visit? NO  (If patient encounters technical issues they should call [phone]    How would you like to obtain your AVS? MyChart    Are changes needed to the allergy or medication list? Pt stated no med changes    Are refills needed on medications prescribed by this physician? NO    Reason for visit: RECHECK    Lucinda GEE  Has patient had flu shot for current/most recent flu season? If so, when? Yes: 12.14.23

## 2024-05-01 NOTE — Clinical Note
Hi SJ, please refer to the A/P section of the visit note. I've recommended revising settings on his CPAP to range 8-18 cm water. Kindly follow up with pt through STM. Thanks so much, Mary Beth

## 2024-05-06 NOTE — NURSING NOTE
Pt will follow up once they get new machine and alysa will be needed at that time. Orders sent to [facility].    Aurelio Yang, Visit facilitator.

## 2024-05-08 ENCOUNTER — DOCUMENTATION ONLY (OUTPATIENT)
Dept: SLEEP MEDICINE | Facility: CLINIC | Age: 29
End: 2024-05-08
Payer: COMMERCIAL

## 2024-05-22 ENCOUNTER — DOCUMENTATION ONLY (OUTPATIENT)
Dept: SLEEP MEDICINE | Facility: CLINIC | Age: 29
End: 2024-05-22
Payer: COMMERCIAL

## 2024-05-22 NOTE — PROGRESS NOTES
STM Recheck:  Patient called he stated he is getting anxiety from the high CPAP pressures. Changed CPAP pressures back to 5-15 cm H20 and Standard response to soft.

## 2025-02-15 ENCOUNTER — HEALTH MAINTENANCE LETTER (OUTPATIENT)
Age: 30
End: 2025-02-15